# Patient Record
Sex: FEMALE | Race: WHITE | Employment: PART TIME | RURAL
[De-identification: names, ages, dates, MRNs, and addresses within clinical notes are randomized per-mention and may not be internally consistent; named-entity substitution may affect disease eponyms.]

---

## 2017-03-17 ENCOUNTER — OFFICE VISIT (OUTPATIENT)
Dept: FAMILY MEDICINE CLINIC | Age: 62
End: 2017-03-17

## 2017-03-17 VITALS
OXYGEN SATURATION: 98 % | HEIGHT: 63 IN | HEART RATE: 88 BPM | WEIGHT: 110.2 LBS | RESPIRATION RATE: 16 BRPM | DIASTOLIC BLOOD PRESSURE: 80 MMHG | SYSTOLIC BLOOD PRESSURE: 146 MMHG | BODY MASS INDEX: 19.53 KG/M2 | TEMPERATURE: 95.7 F

## 2017-03-17 DIAGNOSIS — F17.200 SMOKER: Primary | ICD-10-CM

## 2017-03-17 DIAGNOSIS — G62.9 NEUROPATHY: ICD-10-CM

## 2017-03-17 DIAGNOSIS — E11.40 TYPE 2 DIABETES MELLITUS WITH DIABETIC NEUROPATHY, WITHOUT LONG-TERM CURRENT USE OF INSULIN (HCC): ICD-10-CM

## 2017-03-17 DIAGNOSIS — E78.5 HYPERLIPIDEMIA, UNSPECIFIED HYPERLIPIDEMIA TYPE: ICD-10-CM

## 2017-03-17 RX ORDER — LISINOPRIL 10 MG/1
10 TABLET ORAL DAILY
Qty: 90 TAB | Refills: 0 | Status: SHIPPED | OUTPATIENT
Start: 2017-03-17 | End: 2017-06-05 | Stop reason: SDUPTHER

## 2017-03-17 RX ORDER — PRAVASTATIN SODIUM 20 MG/1
20 TABLET ORAL
Qty: 90 TAB | Refills: 0 | Status: SHIPPED | OUTPATIENT
Start: 2017-03-17 | End: 2017-06-05 | Stop reason: SDUPTHER

## 2017-03-17 RX ORDER — METFORMIN HYDROCHLORIDE 1000 MG/1
1000 TABLET ORAL 2 TIMES DAILY WITH MEALS
Qty: 180 TAB | Refills: 0 | Status: SHIPPED | OUTPATIENT
Start: 2017-03-17 | End: 2017-06-05 | Stop reason: SDUPTHER

## 2017-03-17 RX ORDER — GABAPENTIN 600 MG/1
600 TABLET ORAL 3 TIMES DAILY
Qty: 270 TAB | Refills: 0 | Status: SHIPPED | OUTPATIENT
Start: 2017-03-17 | End: 2017-06-05 | Stop reason: SDUPTHER

## 2017-03-17 NOTE — PATIENT INSTRUCTIONS
Noninsulin Medicines for Type 2 Diabetes: Care Instructions  Your Care Instructions  There are different types of noninsulin medicines for diabetes. Each works in a different way to help you control your blood sugar. Some types help your body make insulin to lower your blood sugar. Others lower how much insulin your body needs. Some can slow how quickly your body digests sugars. And some can remove extra glucose through your urine. Some of these medicines are:  · Alpha-glucosidase inhibitors. These keep starches from breaking down. This means that they lower the amount of glucose absorbed when you eat. They do not help your body make more insulin. So they will not cause low blood sugar unless they are used with other medicines for diabetes. They include acarbose and miglitol. · DPP-4 inhibitors. These help your body increase the level of insulin after eating. They also help your body make less of a hormone that raises blood sugar. They include linagliptin, saxagliptin, and sitagliptin. · Incretin hormones (GLP-1 receptor agonists). Your body makes a protein that can raise your insulin level, lower your blood sugar, and make you less hungry. You can inject hormone medicines that work the same way as this protein. They include exenatide and liraglutide. · Meglitinides. These help your body release insulin. They also help slow how your body digests sugars. In this way, they prevent your blood sugar from rising too fast after you eat. They include nateglinide and repaglinide. · Metformin. This lowers how much glucose your liver makes. And it helps you respond better to insulin. It also lowers the amount of stored sugar that your liver releases when you are not eating. · Sodium glucose co-transporter 2 inhibitors (SGLT2 inhibitors). These help to remove extra glucose through your urine. They may also help some people lose weight. They include canagliflozin, dapagliflozin, and empagliflozin. · Sulfonylureas. These help your body release more insulin. Some work for many hours and can cause low blood sugar if you don't eat as you expected. They include glipizide and glyburide. · Thiazolidinediones. These reduce the amount of blood glucose. They also help you respond better to insulin. They include pioglitazone and rosiglitazone. You may need to take more than one medicine for diabetes. Two or more medicines may work better to lower your blood sugar level than just one medicine. Follow-up care is a key part of your treatment and safety. Be sure to make and go to all appointments, and call your doctor if you are having problems. It's also a good idea to know your test results and keep a list of the medicines you take. How can you care for yourself at home? · Eat a healthy diet. Get some exercise each day. This may help you to reduce how much medicine you need. · Do not take other prescription or over-the-counter medicines, vitamins, herbal products, or supplements without talking to your doctor first. Some medicines for type 2 diabetes can cause problems with other medicines or supplements. · Tell your doctor if you plan to get pregnant. Some of these drugs are not safe for pregnant women. · Be safe with medicines. Take your medicines exactly as prescribed. Meglitinides or sulfonylureas can cause your blood sugar to drop very low. Call your doctor if you think you are having a problem with your medicine. · Check your blood sugar levels often. You can use a glucose monitor. Keeping track can help you know how certain foods, activities, and medicines affect your blood sugar. And it can help you keep your blood sugar from getting so low that it's not safe. When should you call for help? Call 911 anytime you think you may need emergency care. For example, call if:  · You passed out (lost consciousness), or you suddenly become very sleepy or confused.  (You may have very low blood sugar.)  · You have symptoms of high blood sugar, such as:  ¨ Blurred vision. ¨ Trouble staying awake or being woken up. ¨ Fast, deep breathing. ¨ Breath that smells fruity. ¨ Belly pain, not feeling hungry, and vomiting. ¨ Feeling confused. Call your doctor now or seek immediate medical care if:  · You are sick and cannot control your blood sugar. · You have been vomiting or have had diarrhea for more than 6 hours. · Your blood sugar stays higher than the level your doctor has set for you. · You have symptoms of low blood sugar, such as:  ¨ Sweating. ¨ Feeling nervous, shaky, and weak. ¨ Extreme hunger and slight nausea. ¨ Dizziness and headache. ¨ Blurred vision. ¨ Confusion. Watch closely for changes in your health, and be sure to contact your doctor if:  · You have a hard time knowing when your blood sugar is low. · You have trouble keeping your blood sugar in the target range. · You often have problems controlling your blood sugar. · You have symptoms of long-term diabetes problems, such as:  ¨ New vision changes. ¨ New pain, numbness, or tingling in your hands or feet. ¨ Skin problems. Where can you learn more? Go to http://renard-shanae.info/. Enter H153 in the search box to learn more about \"Noninsulin Medicines for Type 2 Diabetes: Care Instructions. \"  Current as of: August 3, 2016  Content Version: 11.1  © 4295-7655 [employer]. Care instructions adapted under license by [employer] (which disclaims liability or warranty for this information). If you have questions about a medical condition or this instruction, always ask your healthcare professional. [place] [address] any warranty or liability for your use of this information.

## 2017-03-17 NOTE — PROGRESS NOTES
Health Maintenance Due   Topic Date Due    EYE EXAM RETINAL OR DILATED Q1  11/22/1965 Done on Feb.20,Howard Young    DTaP/Tdap/Td series (1 - Tdap) 11/22/1976 refused    HEMOGLOBIN A1C Q6M  03/08/2017 refused

## 2017-03-17 NOTE — PROGRESS NOTES
Liz Hernandez is a 64 y.o. female who presents to the office today with the following:  Chief Complaint   Patient presents with    Medication Refill     all meds       HPI  Here today for medication refills. Feeling well today with no complaints. No acute concerns. Tolerating all meds w/o SEs.     T2DM- Non ID. A1c have been good, in pre-DM even. Checks BS BID. FBS range 100-109. PM range 90-95 (2hr postprandial)  Sometimes in low 80s. Denies any major sxs when low, but admits to feeling shaky maybe once/wk. Admits this often happens when does not eat much for dinner. On lisinopril for kidney prophylaxis, but BP is borderline today. Denies hx of HTN. Also takes Gabapentin for DM neuropathy in feet. Gets occasionally pins/needles sensations, but reports pretty well-controlled. Hx hypercholesterolemia. On Pravastatin. No myalgias. Review of Systems   Constitutional: Negative for fever and malaise/fatigue. Eyes: Negative. Respiratory: Negative for cough, shortness of breath and wheezing. Cardiovascular: Negative for chest pain and leg swelling. Gastrointestinal: Negative. Genitourinary: Negative. Musculoskeletal: Negative for myalgias. Skin: Negative for rash. Neurological: Negative for dizziness, tingling, sensory change, speech change and headaches. See HPI. No Known Allergies    Current Outpatient Prescriptions   Medication Sig    metFORMIN (GLUCOPHAGE) 1,000 mg tablet Take 1 Tab by mouth two (2) times daily (with meals).  lisinopril (PRINIVIL, ZESTRIL) 10 mg tablet Take 1 Tab by mouth daily.  pravastatin (PRAVACHOL) 20 mg tablet Take 1 Tab by mouth nightly.  gabapentin (NEURONTIN) 600 mg tablet Take 1 Tab by mouth three (3) times daily. No current facility-administered medications for this visit.         Past Medical History:   Diagnosis Date    Diabetes [facility])        Past Surgical History:   Procedure Laterality Date    HX ORTHOPAEDIC  2000 left hip replacement       Social History     Social History    Marital status:      Spouse name: N/A    Number of children: N/A    Years of education: N/A     Social History Main Topics    Smoking status: Current Some Day Smoker     Packs/day: 0.25    Smokeless tobacco: Never Used    Alcohol use No    Drug use: No    Sexual activity: Not Asked     Other Topics Concern    None     Social History Narrative       History reviewed. No pertinent family history. Physical Exam:  Visit Vitals    /80 (BP 1 Location: Right arm, BP Patient Position: Sitting)    Pulse 88    Temp 95.7 °F (35.4 °C) (Oral)    Resp 16    Ht 5' 2.75\" (1.594 m)    Wt 110 lb 3.2 oz (50 kg)    SpO2 98%    BMI 19.68 kg/m2     Physical Exam   Constitutional: She is oriented to person, place, and time and well-developed, well-nourished, and in no distress. HENT:   Head: Normocephalic and atraumatic. Right Ear: External ear normal.   Left Ear: External ear normal.   Nose: Nose normal.   Mouth/Throat: Oropharynx is clear and moist.   Eyes: Conjunctivae are normal.   Neck: Normal range of motion. Neck supple. Cardiovascular: Normal rate, regular rhythm and normal heart sounds. Pulmonary/Chest: Effort normal. No respiratory distress. She has wheezes (faint diffuse end exp). She has no rales. Lymphadenopathy:     She has no cervical adenopathy. Neurological: She is alert and oriented to person, place, and time. Gait normal.   Skin: Skin is warm and dry. Psychiatric: Mood and affect normal.   Nursing note and vitals reviewed. Assessment/Plan:    ICD-10-CM ICD-9-CM    1. Smoker F17.200 305.1    2.  Type 2 diabetes mellitus with diabetic neuropathy, without long-term current use of insulin (HCC) E11.40 250.60 metFORMIN (GLUCOPHAGE) 1,000 mg tablet  - Explained with the occasional episode of hypoglycemia (and pt admits to not eating much at times), last A1c in preDM range, I think its okay for her to try cutting afternoon dose in half if BS continue as they are. Pt agress, will try, and call if any questions/concerns. To notify if any concerning sxs/hypoglycemic episodes. 357.2 lisinopril (PRINIVIL, ZESTRIL) 10 mg tablet   3. Hyperlipidemia, unspecified hyperlipidemia type E78.5 272.4 pravastatin (PRAVACHOL) 20 mg tablet   4. Neuropathy G62.9 355.9 gabapentin (NEURONTIN) 600 mg tablet     Due for labs but says really expensive last time, really cannot afford today, does not have insurance. Explained necessity for labs and explained risks (notably with borderline elev Potassium last time), pt verbalizes understanding and still declines. Agrees to return in next 3 months to establish care with new PCP Dr. Star Rubio and will have labs done at that time. In meantime to rtc for any issues. .  Follow-up Disposition:  Return in about 3 months (around 6/17/2017), or sooner prn.     Vignesh Duff PA-C

## 2017-03-17 NOTE — MR AVS SNAPSHOT
Visit Information Date & Time Provider Department Dept. Phone Encounter #  
 3/17/2017 10:00 AM Geraldo Sorensen PA-C [address] [id number] Upcoming Health Maintenance Date Due  
 EYE EXAM RETINAL OR DILATED Q1 11/22/1965 DTaP/Tdap/Td series (1 - Tdap) 11/22/1976 HEMOGLOBIN A1C Q6M 3/8/2017 FOOT EXAM Q1 9/7/2017 FOBT Q 1 YEAR AGE 50-75 9/7/2017 MICROALBUMIN Q1 9/8/2017 LIPID PANEL Q1 9/8/2017 BREAST CANCER SCRN MAMMOGRAM 9/7/2018 PAP AKA CERVICAL CYTOLOGY 9/7/2019 Allergies as of 3/17/2017  Review Complete On: 3/17/2017 By: Geraldo Sorensen PA-C No Known Allergies Current Immunizations  Never Reviewed No immunizations on file. Not reviewed this visit You Were Diagnosed With   
  
 Codes Comments Type 2 diabetes mellitus with diabetic neuropathy, without long-term current use of insulin (HCC)     ICD-10-CM: E11.40 ICD-9-CM: 250.60, 357.2 Hyperlipidemia, unspecified hyperlipidemia type     ICD-10-CM: E78.5 ICD-9-CM: 272.4 Neuropathy     ICD-10-CM: G62.9 ICD-9-CM: 238. 9 Vitals BP Pulse Temp Resp Height(growth percentile) Weight(growth percentile) 146/80 (BP 1 Location: Right arm, BP Patient Position: Sitting) 88 95.7 °F (35.4 °C) (Oral) 16 5' 2.75\" (1.594 m) 110 lb 3.2 oz (50 kg) SpO2 BMI OB Status Smoking Status 98% 19.68 kg/m2 Postmenopausal Current Some Day Smoker Vitals History BMI and BSA Data Body Mass Index Body Surface Area 19.68 kg/m 2 1.49 m 2 Preferred Pharmacy Pharmacy Name Phone [facility] [address], [address] [place] [address] Ave Your Updated Medication List  
  
   
This list is accurate as of: 3/17/17 10:49 AM.  Always use your most recent med list.  
  
  
  
  
 gabapentin 600 mg tablet Commonly known as:  NEURONTIN Take 1 Tab by mouth three (3) times daily. lisinopril 10 mg tablet Commonly known as:  Camila [place] Take 1 Tab by mouth daily. metFORMIN 1,000 mg tablet Commonly known as:  GLUCOPHAGE Take 1 Tab by mouth two (2) times daily (with meals). pravastatin 20 mg tablet Commonly known as:  PRAVACHOL Take 1 Tab by mouth nightly. Prescriptions Sent to Pharmacy Refills  
 metFORMIN (GLUCOPHAGE) 1,000 mg tablet 0 Sig: Take 1 Tab by mouth two (2) times daily (with meals). Class: Normal  
 Pharmacy: [facility], [facility] Higinio Freeman Ph #: [phone] Route: Oral  
 lisinopril (PRINIVIL, ZESTRIL) 10 mg tablet 0 Sig: Take 1 Tab by mouth daily. Class: Normal  
 Pharmacy: [facility], [facility] Higinio Bandarkadeem Ph #: [phone] Route: Oral  
 pravastatin (PRAVACHOL) 20 mg tablet 0 Sig: Take 1 Tab by mouth nightly. Class: Normal  
 Pharmacy: [facility], [address] [place] [address].  Ph #: [phone] Route: Oral  
 gabapentin (NEURONTIN) 600 mg tablet 0 Sig: Take 1 Tab by mouth three (3) times daily. Class: Normal  
 Pharmacy: [facility], [address] [place] [address].  Ph #: [phone] Route: Oral  
  
Introducing [facility] & HEALTH SERVICES! Celestine Prescott introduces [employer] patient portal. Now you can access parts of your medical record, email your doctor's office, and request medication refills online. 1. In your internet browser, go to [email or web address] 2. Click on the First Time User? Click Here link in the Sign In box. You will see the New Member Sign Up page. 3. Enter your [employer] Access Code exactly as it appears below. You will not need to use this code after youve completed the sign-up process. If you do not sign up before the expiration date, you must request a new code. · [employer] Access Code: [email or web address] Expires: 6/15/2017  9:59 AM 
 
 4. Enter the last four digits of your Social Security Number (xxxx) and Date of Birth (mm/dd/yyyy) as indicated and click Submit. You will be taken to the next sign-up page. 5. Create a [employer] ID. This will be your [employer] login ID and cannot be changed, so think of one that is secure and easy to remember. 6. Create a [employer] password. You can change your password at any time. 7. Enter your Password Reset Question and Answer. This can be used at a later time if you forget your password. 8. Enter your e-mail address. You will receive e-mail notification when new information is available in [address] 9. Click Sign Up. You can now view and download portions of your medical record. 10. Click the Download Summary menu link to download a portable copy of your medical information. If you have questions, please visit the Frequently Asked Questions section of the [employer] website. Remember, [employer] is NOT to be used for urgent needs. For medical emergencies, dial 911. Now available from your iPhone and Android! Please provide this summary of care documentation to your next provider. Your primary care clinician is listed as Lili Vitale. If you have any questions after today's visit, please call [phone].

## 2017-06-05 ENCOUNTER — OFFICE VISIT (OUTPATIENT)
Dept: FAMILY MEDICINE CLINIC | Age: 62
End: 2017-06-05

## 2017-06-05 VITALS
SYSTOLIC BLOOD PRESSURE: 135 MMHG | BODY MASS INDEX: 19.31 KG/M2 | DIASTOLIC BLOOD PRESSURE: 80 MMHG | WEIGHT: 109 LBS | HEIGHT: 63 IN | TEMPERATURE: 96.8 F | HEART RATE: 67 BPM | RESPIRATION RATE: 16 BRPM | OXYGEN SATURATION: 93 %

## 2017-06-05 DIAGNOSIS — G62.9 NEUROPATHY: ICD-10-CM

## 2017-06-05 DIAGNOSIS — F17.200 SMOKER: ICD-10-CM

## 2017-06-05 DIAGNOSIS — I10 ESSENTIAL HYPERTENSION: ICD-10-CM

## 2017-06-05 DIAGNOSIS — E78.5 HYPERLIPIDEMIA, UNSPECIFIED HYPERLIPIDEMIA TYPE: ICD-10-CM

## 2017-06-05 DIAGNOSIS — E11.40 TYPE 2 DIABETES MELLITUS WITH DIABETIC NEUROPATHY, WITHOUT LONG-TERM CURRENT USE OF INSULIN (HCC): Primary | ICD-10-CM

## 2017-06-05 RX ORDER — ASPIRIN 81 MG/1
81 TABLET ORAL DAILY
COMMUNITY
Start: 2017-06-05

## 2017-06-05 RX ORDER — LISINOPRIL 10 MG/1
10 TABLET ORAL DAILY
Qty: 90 TAB | Refills: 1 | Status: SHIPPED | OUTPATIENT
Start: 2017-06-05 | End: 2017-12-06 | Stop reason: SDUPTHER

## 2017-06-05 RX ORDER — GABAPENTIN 600 MG/1
600 TABLET ORAL 3 TIMES DAILY
Qty: 270 TAB | Refills: 1 | Status: SHIPPED | OUTPATIENT
Start: 2017-06-05 | End: 2017-12-06 | Stop reason: SDUPTHER

## 2017-06-05 RX ORDER — PRAVASTATIN SODIUM 20 MG/1
20 TABLET ORAL
Qty: 90 TAB | Refills: 1 | Status: SHIPPED | OUTPATIENT
Start: 2017-06-05 | End: 2017-12-06 | Stop reason: SDUPTHER

## 2017-06-05 RX ORDER — METFORMIN HYDROCHLORIDE 1000 MG/1
1000 TABLET ORAL 2 TIMES DAILY WITH MEALS
Qty: 180 TAB | Refills: 1 | Status: SHIPPED | OUTPATIENT
Start: 2017-06-05 | End: 2017-12-06 | Stop reason: SDUPTHER

## 2017-06-05 NOTE — PATIENT INSTRUCTIONS
Continue current medications    Work on diet and exercise    Lab work this month    Keep planned follow up visit     Work on your smoking cessation  Notify Dr. Rocío Kwok if you require any help with this  You may call 1-800-QUIT NOW for additional help and to get nicotine patches    Follow up if you change your mind on any additional testing or immunizations

## 2017-06-05 NOTE — MR AVS SNAPSHOT
Visit Information Date & Time Provider Department Dept. Phone Encounter #  
 6/5/2017  3:30 PM Teodora Tabor MD [address] [id number] Follow-up Instructions Return in about 6 months (around 12/5/2017). Follow-up and Disposition History Upcoming Health Maintenance Date Due  
 EYE EXAM RETINAL OR DILATED Q1 11/22/1965 DTaP/Tdap/Td series (1 - Tdap) 11/22/1976 HEMOGLOBIN A1C Q6M 3/8/2017 INFLUENZA AGE 9 TO ADULT 8/1/2017 FOOT EXAM Q1 9/7/2017 FOBT Q 1 YEAR AGE 50-75 9/7/2017 MICROALBUMIN Q1 9/8/2017 LIPID PANEL Q1 9/8/2017 BREAST CANCER SCRN MAMMOGRAM 9/7/2018 PAP AKA CERVICAL CYTOLOGY 9/7/2019 Allergies as of 6/5/2017  Review Complete On: 6/5/2017 By: Teodora Tabor MD  
 No Known Allergies Current Immunizations  Never Reviewed No immunizations on file. Not reviewed this visit You Were Diagnosed With   
  
 Codes Comments Type 2 diabetes mellitus with diabetic neuropathy, without long-term current use of insulin (HCC)    -  Primary ICD-10-CM: E11.40 ICD-9-CM: 250.60, 357.2 Hyperlipidemia, unspecified hyperlipidemia type     ICD-10-CM: E78.5 ICD-9-CM: 272.4 Essential hypertension     ICD-10-CM: I10 
ICD-9-CM: 401.9 Smoker     ICD-10-CM: A66.466 ICD-9-CM: 305.1 Neuropathy     ICD-10-CM: G62.9 ICD-9-CM: 992. 9 Vitals BP Pulse Temp Resp Height(growth percentile) Weight(growth percentile) 135/80 67 96.8 °F (36 °C) (Oral) 16 5' 2.75\" (1.594 m) 109 lb (49.4 kg) SpO2 BMI OB Status Smoking Status 93% 19.46 kg/m2 Postmenopausal Current Some Day Smoker Vitals History BMI and BSA Data Body Mass Index Body Surface Area  
 19.46 kg/m 2 1.48 m 2 Preferred Pharmacy Pharmacy Name Phone [facility] [address] [place] [phone] Your Updated Medication List  
  
   
 This list is accurate as of: 6/5/17  4:48 PM.  Always use your most recent med list.  
  
  
  
  
 aspirin delayed-release 81 mg tablet Take 1 Tab by mouth daily. gabapentin 600 mg tablet Commonly known as:  NEURONTIN Take 1 Tab by mouth three (3) times daily. lisinopril 10 mg tablet Commonly known as:  Luz Antony Take 1 Tab by mouth daily. metFORMIN 1,000 mg tablet Commonly known as:  GLUCOPHAGE Take 1 Tab by mouth two (2) times daily (with meals). pravastatin 20 mg tablet Commonly known as:  PRAVACHOL Take 1 Tab by mouth nightly. Prescriptions Sent to Pharmacy Refills  
 gabapentin (NEURONTIN) 600 mg tablet 1 Sig: Take 1 Tab by mouth three (3) times daily. Class: Normal  
 Pharmacy: [address] [place] [address].  Ph #: [phone] Route: Oral  
 pravastatin (PRAVACHOL) 20 mg tablet 1 Sig: Take 1 Tab by mouth nightly. Class: Normal  
 Pharmacy: [address] [place] [address].  Ph #: [phone] Route: Oral  
 lisinopril (PRINIVIL, ZESTRIL) 10 mg tablet 1 Sig: Take 1 Tab by mouth daily. Class: Normal  
 Pharmacy: [facility], [facility] Higinio Freeman Ph #: [phone] Route: Oral  
 metFORMIN (GLUCOPHAGE) 1,000 mg tablet 1 Sig: Take 1 Tab by mouth two (2) times daily (with meals). Class: Normal  
 Pharmacy: [facility], [facility] Higinio Freeman Ph #: [phone] Route: Oral  
  
We Performed the Following CREATININE [id number] CPT(R)] HEMOGLOBIN A1C WITH EAG [35334 CPT(R)] POTASSIUM [id number] CPT(R)] [place] COLLECTION VENOUS BLOOD,VENIPUNCTURE [id number] CPT(R)] [place] HANDLG&/OR CONVEY OF SPEC FOR TR OFFICE TO LAB [03577 CPT(R)] Follow-up Instructions Return in about 6 months (around 12/5/2017). To-Do List   
 07/05/2017 Lab:  CREATININE   
  
 07/05/2017 Lab:  HEMOGLOBIN A1C WITH EAG   
  
 07/05/2017 Lab:  POTASSIUM Patient Instructions Continue current medications Work on diet and exercise Lab work this month Keep planned follow up visit Work on your smoking cessation Notify Dr. Марина Grimes if you require any help with this You may call 5-800-QUIT NOW for additional help and to get nicotine patches Follow up if you change your mind on any additional testing or immunizations Introducing [facility] & [facility]! Jammie Sims introduces [employer] patient portal. Now you can access parts of your medical record, email your doctor's office, and request medication refills online. 1. In your internet browser, go to [email or web address] 2. Click on the First Time User? Click Here link in the Sign In box. You will see the New Member Sign Up page. 3. Enter your [employer] Access Code exactly as it appears below. You will not need to use this code after youve completed the sign-up process. If you do not sign up before the expiration date, you must request a new code. · [employer] Access Code: [email or web address] Expires: 6/15/2017  9:59 AM 
 
4. Enter the last four digits of your Social Security Number (xxxx) and Date of Birth (mm/dd/yyyy) as indicated and click Submit. You will be taken to the next sign-up page. 5. Create a [employer] ID. This will be your [employer] login ID and cannot be changed, so think of one that is secure and easy to remember. 6. Create a [employer] password. You can change your password at any time. 7. Enter your Password Reset Question and Answer. This can be used at a later time if you forget your password. 8. Enter your e-mail address. You will receive e-mail notification when new information is available in [address] 9. Click Sign Up. You can now view and download portions of your medical record. 10. Click the Download Summary menu link to download a portable copy of your medical information. If you have questions, please visit the Frequently Asked Questions section of the [employer] website. Remember, [employer] is NOT to be used for urgent needs. For medical emergencies, dial 911. Now available from your iPhone and Android! Please provide this summary of care documentation to your next provider. Your primary care clinician is listed as Phys Other. If you have any questions after today's visit, please call [phone].

## 2017-06-05 NOTE — PROGRESS NOTES
Otilia Bosch is a 64 y.o. female who presents to the office today with the following:  Chief Complaint   Patient presents with    Medication Refill       No Known Allergies    Current Outpatient Prescriptions   Medication Sig    aspirin delayed-release 81 mg tablet Take 1 Tab by mouth daily.  gabapentin (NEURONTIN) 600 mg tablet Take 1 Tab by mouth three (3) times daily.  pravastatin (PRAVACHOL) 20 mg tablet Take 1 Tab by mouth nightly.  lisinopril (PRINIVIL, ZESTRIL) 10 mg tablet Take 1 Tab by mouth daily.  metFORMIN (GLUCOPHAGE) 1,000 mg tablet Take 1 Tab by mouth two (2) times daily (with meals). No current facility-administered medications for this visit. Past Medical History:   Diagnosis Date    Diabetes [facility])        Past Surgical History:   Procedure Laterality Date    HX ORTHOPAEDIC  2000    left hip replacement       History   Smoking Status    Current Some Day Smoker    Packs/day: 0.25   Smokeless Tobacco    Never Used       History reviewed. No pertinent family history. History of Present Illness:  Patient here for ongoing medical follow-up and refills. Wants to establish in my practice    Patient has a history of hypertension. Controlled on her current regiment. On aspirin therapy. No cardiac complaints. Base metabolic profile normal 7/29 except mild hyperkalemia. We did discuss low potassium diet. She does not want me to do an EKG. She is aware it is indicated. She wants only very limited lab work    Patient has hyperlipidemia. On Pravachol. Lab work 9/16 quite acceptable. LDL at goal.  Normal LFTs. She would prefer I not repeat lipid panel today as she has no insurance    Patient does have diabetes. Most recent A1c 6.0. Spot urine for microalbumin -9/16. Diabetic foot exam 9/16. Patient is aware she should follow with her eye doctor. We did discuss diet and exercise    Patient does have asthma/COPD. She does continue to smoke.   She has no intention of quitting. She tells me she has cut back. I do hear some wheezing. She denies any cough shortness of breath or respiratory complaints. She does not want any additional treatment for this. She does not want CAT scans or x-rays. I did give her information on the smoke ending hotline    She does have peripheral neuropathy secondary to her diabetes. She did ask for refill of her gabapentin. No new complaints here. Patient states her past medical history is otherwise negative    She declines breast exams and mammograms Pap smears colonoscopies stool tests or any additional screening test.  She is aware there indicated    Patient declines any and all immunizations. She is aware there indicated      Review of Systems:      Review of systems negative except as noted above    Physical Exam:  Visit Vitals    /80    Pulse 67    Temp 96.8 °F (36 °C) (Oral)    Resp 16    Ht 5' 2.75\" (1.594 m)    Wt 109 lb (49.4 kg)    SpO2 93%    BMI 19.46 kg/m2     Vitals:    06/05/17 1600 06/05/17 1624   BP: 158/67 135/80   BP 1 Location: Right arm    BP Patient Position: Sitting    Pulse: 67    Resp: 16    Temp: 96.8 °F (36 °C)    TempSrc: Oral    SpO2: 93%    Weight: 109 lb (49.4 kg)    Height: 5' 2.75\" (1.594 m)      Patient no acute distress vital signs on repeat as above  Head is normocephalic  External ears normal.   Eyes PERRLA. EOMs full. Sclera clear  Nose within normal limits. Nares  normal.  No significant congestion  OP mucosa normal, no obvious lesions. Pharynx normal.  No erythema or exudate. Structures midline. Neck no nodes no masses no bruits  Chest had some coarse breath sounds with scattered wheezes. No rhonchi or rales. Virginia Neer air exchange  Cor regular rate and rhythm no S3-S4 no murmurs  Abdomen no HSM no masses soft and was nontender  Extremities no edema. Nontender. Good range of motion  Gait and gross neurologic exam were normal    Assessment/Plan:  1.  Type 2 diabetes mellitus with diabetic neuropathy, without long-term current use of insulin ([address]  Historically controlled. Will check A1c. Refilled her medication. Patient agrees to follow-up in 6 months  - HEMOGLOBIN A1C WITH EAG; Future  - lisinopril (PRINIVIL, ZESTRIL) 10 mg tablet; Take 1 Tab by mouth daily. Dispense: 90 Tab; Refill: 1  - metFORMIN (GLUCOPHAGE) 1,000 mg tablet; Take 1 Tab by mouth two (2) times daily (with meals). Dispense: 180 Tab; Refill: 1    2. Hyperlipidemia, unspecified hyperlipidemia type  Medication refill. Lab work normal last fall. She declines repeat lab work at this point  - pravastatin (PRAVACHOL) 20 mg tablet; Take 1 Tab by mouth nightly. Dispense: 90 Tab; Refill: 1    3. Essential hypertension    - CREATININE; Future  - POTASSIUM; Future    4. Smoker      5. Neuropathy    - gabapentin (NEURONTIN) 600 mg tablet; Take 1 Tab by mouth three (3) times daily. Dispense: 270 Tab; Refill: 1      Patient Instructions   Continue current medications    Work on diet and exercise    Lab work this month    Keep planned follow up visit     Work on your smoking cessation  Notify Dr. Amira Ramirez if you require any help with this  You may call [facility]-QUIT NOW for additional help and to get nicotine patches    Follow up if you change your mind on any additional testing or immunizations        Continue current therapy plan except for indicated above. Verbal and written instructions (see AVS) provided.  Patient expresses understanding of diagnosis and treatment plan. Follow-up Disposition:  Return in about 6 months (around 12/5/2017). Kailee Pastor.  Amira Ramirez MD

## 2017-06-06 NOTE — PROGRESS NOTES
Labs reviewed please notify patient  Sugars acceptable with glycohemoglobin 6.3  Creatinine normal  Potassium still borderline high    Continue current medications  Avoid foods high in potassium  Keep planned follow-up

## 2017-06-08 ENCOUNTER — TELEPHONE (OUTPATIENT)
Dept: FAMILY MEDICINE CLINIC | Age: 62
End: 2017-06-08

## 2017-06-08 NOTE — TELEPHONE ENCOUNTER
Patient returning call. Documentation states she was called and notified about her her lab results. Patient states she did not receive those results. Please call her back at [phone].

## 2017-07-11 ENCOUNTER — OFFICE VISIT (OUTPATIENT)
Dept: FAMILY MEDICINE CLINIC | Age: 62
End: 2017-07-11

## 2017-07-11 VITALS
HEART RATE: 80 BPM | TEMPERATURE: 96.8 F | WEIGHT: 103 LBS | BODY MASS INDEX: 18.95 KG/M2 | OXYGEN SATURATION: 98 % | RESPIRATION RATE: 18 BRPM | HEIGHT: 62 IN | SYSTOLIC BLOOD PRESSURE: 132 MMHG | DIASTOLIC BLOOD PRESSURE: 59 MMHG

## 2017-07-11 DIAGNOSIS — M25.532 PAIN IN BOTH WRISTS: Primary | ICD-10-CM

## 2017-07-11 DIAGNOSIS — M25.531 PAIN IN BOTH WRISTS: Primary | ICD-10-CM

## 2017-07-11 RX ORDER — MELOXICAM 7.5 MG/1
TABLET ORAL DAILY
COMMUNITY
End: 2018-04-30

## 2017-07-11 RX ORDER — PREDNISONE 10 MG/1
TABLET ORAL
Qty: 39 TAB | Refills: 0 | Status: SHIPPED | OUTPATIENT
Start: 2017-07-11 | End: 2017-12-06 | Stop reason: ALTCHOICE

## 2017-07-11 NOTE — MR AVS SNAPSHOT
Visit Information Date & Time Provider Department Dept. Phone Encounter #  
 7/11/2017 10:20 AM Rip Monreal  [facility] [phone] [id number] Follow-up Instructions Return if symptoms worsen or fail to improve. Follow-up and Disposition History Your Appointments 7/11/2017 10:20 AM  
Any with Rip Monreal MD  
[address] [facility] ([address]) Appt Note: vomiting/ hand pain self pay 7/11/17 [facility] [phone]  
[phone]  
  
   
 [address] [facility] [place] [address] Upcoming Health Maintenance Date Due  
 EYE EXAM RETINAL OR DILATED Q1 11/22/1965 DTaP/Tdap/Td series (1 - Tdap) 11/22/1976 FOBT Q 1 YEAR AGE 50-75 9/7/2017 INFLUENZA AGE 9 TO ADULT 8/1/2017 FOOT EXAM Q1 9/7/2017 MICROALBUMIN Q1 9/8/2017 LIPID PANEL Q1 9/8/2017 HEMOGLOBIN A1C Q6M 12/5/2017 BREAST CANCER SCRN MAMMOGRAM 9/7/2018 PAP AKA CERVICAL CYTOLOGY 9/7/2019 Allergies as of 7/11/2017  Review Complete On: 7/11/2017 By: Matty Knowles LPN No Known Allergies Current Immunizations  Never Reviewed No immunizations on file. Not reviewed this visit You Were Diagnosed With   
  
 Codes Comments Pain in both wrists    -  Primary ICD-10-CM: M25.531, M25.532 ICD-9-CM: 719.43 Vitals BP Pulse Temp Resp Height(growth percentile) Weight(growth percentile) 132/59 (BP 1 Location: Left arm, BP Patient Position: Sitting) 80 96.8 °F (36 °C) 18 5' 2\" (1.575 m) 103 lb (46.7 kg) SpO2 BMI OB Status Smoking Status 98% 18.84 kg/m2 Postmenopausal Current Some Day Smoker BMI and BSA Data Body Mass Index Body Surface Area  
 18.84 kg/m 2 1.43 m 2 Preferred Pharmacy Pharmacy Name Phone [facility] [address] [place] [address] [facility] [phone] Your Updated Medication List  
  
   
 This list is accurate as of: 7/11/17  9:09 AM.  Always use your most recent med list.  
  
  
  
  
 aspirin delayed-release 81 mg tablet Take 1 Tab by mouth daily. gabapentin 600 mg tablet Commonly known as:  NEURONTIN Take 1 Tab by mouth three (3) times daily. lisinopril 10 mg tablet Commonly known as:  Clarence Littler Take 1 Tab by mouth daily. meloxicam 7.5 mg tablet Commonly known as:  MOBIC Take  by mouth daily. metFORMIN 1,000 mg tablet Commonly known as:  GLUCOPHAGE Take 1 Tab by mouth two (2) times daily (with meals). NAPROXEN SODIUM  
by Does Not Apply route. pravastatin 20 mg tablet Commonly known as:  PRAVACHOL Take 1 Tab by mouth nightly. predniSONE 10 mg tablet Commonly known as:  Stephanie Norlander Take 6 tab for 3d, then 4 tab for 3 d, then 2 tab for 3 d, then 1 tab for 3 d Prescriptions Sent to Pharmacy Refills  
 predniSONE (DELTASONE) 10 mg tablet 0 Sig: Take 6 tab for 3d, then 4 tab for 3 d, then 2 tab for 3 d, then 1 tab for 3 d Class: Normal  
 Pharmacy: [facility] Higinio Portillo Ph #: [phone] We Performed the Following VT COLLECTION VENOUS BLOOD,VENIPUNCTURE [id number] CPT(R)] VT HANDLG&/OR CONVEY OF SPEC FOR TR OFFICE TO LAB [32039 CPT(R)] URIC ACID [id number] CPT(R)] Follow-up Instructions Return if symptoms worsen or fail to improve. Introducing [facility]! Racheal Lutz introduces Departing patient portal. Now you can access parts of your medical record, email your doctor's office, and request medication refills online. 1. In your internet browser, go to [email or web address] 2. Click on the First Time User? Click Here link in the Sign In box. You will see the New Member Sign Up page. 3. Enter your Departing Access Code exactly as it appears below.  You will not need to use this code after youve completed the sign-up process. If you do not sign up before the expiration date, you must request a new code. · [employer] Access Code: [email or web address] Expires: 10/9/2017  9:09 AM 
 
4. Enter the last four digits of your Social Security Number (xxxx) and Date of Birth (mm/dd/yyyy) as indicated and click Submit. You will be taken to the next sign-up page. 5. Create a [employer]Kai ID. This will be your [employer] login ID and cannot be changed, so think of one that is secure and easy to remember. 6. Create a [employer]Kai password. You can change your password at any time. 7. Enter your Password Reset Question and Answer. This can be used at a later time if you forget your password. 8. Enter your e-mail address. You will receive e-mail notification when new information is available in [address] 9. Click Sign Up. You can now view and download portions of your medical record. 10. Click the Download Summary menu link to download a portable copy of your medical information. If you have questions, please visit the Frequently Asked Questions section of the [employer] website. Remember, [employer] is NOT to be used for urgent needs. For medical emergencies, dial 911. Now available from your iPhone and Android! Please provide this summary of care documentation to your next provider. Your primary care clinician is listed as Phys Other. If you have any questions after today's visit, please call [phone].

## 2017-07-11 NOTE — PROGRESS NOTES
HISTORY OF PRESENT ILLNESS  Mai Ambriz is a 64 y.o. female. Chief Complaint   Patient presents with    Hand Pain     arthritis, bilateral, severe pain causes nausea/vomiting       HPI  Hands swollen since 3d ago on right  Left hand swollen since yesterday  Pain 11/10    Saw Urgent Care Sun  On Mobic 7.5 once a day  Prev on high dose of Naproxen and helped better  Did not have Xrays or Bw  No injury    No Hx of RA, was tested for it    No Hx of Gout  Working with seafood    ROS  Past Medical History:   Diagnosis Date    Diabetes ([facility] [address]      Current Outpatient Prescriptions   Medication Sig Dispense Refill    meloxicam (MOBIC) 7.5 mg tablet Take  by mouth daily.  NAPROXEN SODIUM by Does Not Apply route.  predniSONE (DELTASONE) 10 mg tablet Take 6 tab for 3d, then 4 tab for 3 d, then 2 tab for 3 d, then 1 tab for 3 d 39 Tab 0    aspirin delayed-release 81 mg tablet Take 1 Tab by mouth daily.  gabapentin (NEURONTIN) 600 mg tablet Take 1 Tab by mouth three (3) times daily. 270 Tab 1    pravastatin (PRAVACHOL) 20 mg tablet Take 1 Tab by mouth nightly. 90 Tab 1    lisinopril (PRINIVIL, ZESTRIL) 10 mg tablet Take 1 Tab by mouth daily. 90 Tab 1    metFORMIN (GLUCOPHAGE) 1,000 mg tablet Take 1 Tab by mouth two (2) times daily (with meals). 180 Tab 1     No Known Allergies  Visit Vitals    /59 (BP 1 Location: Left arm, BP Patient Position: Sitting)    Pulse 80    Temp 96.8 °F (36 °C)    Resp 18    Ht 5' 2\" (1.575 m)    Wt 103 lb (46.7 kg)    SpO2 98%    BMI 18.84 kg/m2     Physical Exam   Constitutional: She is oriented to person, place, and time. She appears well-developed and well-nourished. No distress. HENT:   Head: Normocephalic and atraumatic.    Eyes: Conjunctivae and EOM are normal.   Pulmonary/Chest: Effort normal.   Musculoskeletal:   Both wrists with mild swelling and redness over distal forearms, decreased ROM of wrist d/t pain, mild tenderness   Neurological: She is alert and oriented to person, place, and time. Skin: Skin is warm and dry. No open wounds   Psychiatric: She has a normal mood and affect. Nursing note and vitals reviewed. ASSESSMENT and PLAN    ICD-10-CM ICD-9-CM    1.  Pain in both wrists M25.531 719.43 URIC ACID    M25.532  predniSONE (DELTASONE) 10 mg tablet   Tylenol prn

## 2017-07-12 LAB — URATE SERPL-MCNC: 3.2 MG/DL (ref 2.5–7.1)

## 2017-07-13 LAB
CREAT SERPL-MCNC: 0.54 MG/DL (ref 0.57–1)
EST. AVERAGE GLUCOSE BLD GHB EST-MCNC: 134 MG/DL
HBA1C MFR BLD: 6.3 % (ref 4.8–5.6)
POTASSIUM SERPL-SCNC: 5.6 MMOL/L (ref 3.5–5.2)

## 2017-12-06 ENCOUNTER — OFFICE VISIT (OUTPATIENT)
Dept: FAMILY MEDICINE CLINIC | Age: 62
End: 2017-12-06

## 2017-12-06 VITALS
WEIGHT: 111 LBS | SYSTOLIC BLOOD PRESSURE: 129 MMHG | TEMPERATURE: 98.5 F | OXYGEN SATURATION: 98 % | DIASTOLIC BLOOD PRESSURE: 57 MMHG | HEIGHT: 62 IN | RESPIRATION RATE: 18 BRPM | HEART RATE: 80 BPM | BODY MASS INDEX: 20.43 KG/M2

## 2017-12-06 DIAGNOSIS — F17.200 SMOKER: ICD-10-CM

## 2017-12-06 DIAGNOSIS — E78.5 HYPERLIPIDEMIA, UNSPECIFIED HYPERLIPIDEMIA TYPE: ICD-10-CM

## 2017-12-06 DIAGNOSIS — J45.909 MILD ASTHMA WITHOUT COMPLICATION, UNSPECIFIED WHETHER PERSISTENT: ICD-10-CM

## 2017-12-06 DIAGNOSIS — E11.40 TYPE 2 DIABETES MELLITUS WITH DIABETIC NEUROPATHY, WITHOUT LONG-TERM CURRENT USE OF INSULIN (HCC): Primary | ICD-10-CM

## 2017-12-06 DIAGNOSIS — I10 ESSENTIAL HYPERTENSION: ICD-10-CM

## 2017-12-06 DIAGNOSIS — G62.9 NEUROPATHY: ICD-10-CM

## 2017-12-06 RX ORDER — PRAVASTATIN SODIUM 20 MG/1
20 TABLET ORAL
Qty: 90 TAB | Refills: 1 | Status: SHIPPED | OUTPATIENT
Start: 2017-12-06 | End: 2018-04-30 | Stop reason: SDUPTHER

## 2017-12-06 RX ORDER — LISINOPRIL 10 MG/1
10 TABLET ORAL DAILY
Qty: 90 TAB | Refills: 1 | Status: SHIPPED | OUTPATIENT
Start: 2017-12-06 | End: 2018-04-30 | Stop reason: SDUPTHER

## 2017-12-06 RX ORDER — METFORMIN HYDROCHLORIDE 1000 MG/1
1000 TABLET ORAL 2 TIMES DAILY WITH MEALS
Qty: 180 TAB | Refills: 1 | Status: SHIPPED | OUTPATIENT
Start: 2017-12-06 | End: 2018-04-30 | Stop reason: SDUPTHER

## 2017-12-06 RX ORDER — GABAPENTIN 600 MG/1
600 TABLET ORAL 3 TIMES DAILY
Qty: 270 TAB | Refills: 1 | Status: SHIPPED | OUTPATIENT
Start: 2017-12-06 | End: 2018-04-30 | Stop reason: SDUPTHER

## 2017-12-06 NOTE — PROGRESS NOTES
Mercy Yoo is a 58 y.o. female who presents to the office today with the following:  Chief Complaint   Patient presents with    Medication Refill       No Known Allergies    Current Outpatient Prescriptions   Medication Sig    metFORMIN (GLUCOPHAGE) 1,000 mg tablet Take 1 Tab by mouth two (2) times daily (with meals).  lisinopril (PRINIVIL, ZESTRIL) 10 mg tablet Take 1 Tab by mouth daily.  pravastatin (PRAVACHOL) 20 mg tablet Take 1 Tab by mouth nightly.  gabapentin (NEURONTIN) 600 mg tablet Take 1 Tab by mouth three (3) times daily.  aspirin delayed-release 81 mg tablet Take 1 Tab by mouth daily.  meloxicam (MOBIC) 7.5 mg tablet Take  by mouth daily.  NAPROXEN SODIUM by Does Not Apply route. No current facility-administered medications for this visit. Past Medical History:   Diagnosis Date    Diabetes [facility])        Past Surgical History:   Procedure Laterality Date    HX ORTHOPAEDIC  2000    left hip replacement       History   Smoking Status    Current Some Day Smoker    Packs/day: 0.25   Smokeless Tobacco    Never Used       No family history on file. History of Present Illness:  Patient here for ongoing medical follow-up and refills. Patient has a history of hypertension. Controlled on her current regiment. On aspirin therapy. No cardiac complaints. Base metabolic profile normal 4/23 except mild hyperkalemia. We did discuss low potassium diet. Lab work June 2017 normal creatinine potassium again remained mildly elevated. We are repeating lab work today. She tends to get very tense and clenches her fist during blood draws which can raise her potassium. She does not want me to do an EKG. She is aware it is indicated. She wants only very limited lab work    Patient has hyperlipidemia. On Pravachol. Lab work 9/16 quite acceptable. LDL at goal.  Normal LFTs. She would prefer I not repeat lipid panel today as she has no insurance.   She would be willing to have SGOT checked. Patient does have diabetes. Most recent A1c 6.0. Spot urine for microalbumin -9/16. Diabetic foot exam 12/17. Patient is aware she should follow with her eye doctor. We did discuss diet and exercise. She brought in her glucose readings from home. They are all in the very low 100s. She does not want any lab work for diabetes today. She is aware as indicated. She does not want urine testing. She is on ACE inhibitor    Patient does have asthma/COPD. She does continue to smoke. She has no intention of quitting. She tells me she has cut back. I do hear some wheezing. She denies any cough shortness of breath or respiratory complaints. She does not want any additional treatment for this. She does not want CAT scans or x-rays. I did give her information on the smoke ending hotline    She does have peripheral neuropathy secondary to her diabetes. She did ask for refill of her gabapentin. No new complaints here. Patient states her past medical history is otherwise negative    She declines breast exams and mammograms Pap smears colonoscopies stool tests or any additional screening test.  She is aware there indicated    Patient declines any and all immunizations.   She is aware there indicated      Review of Systems:      Review of systems negative except as noted above    Physical Exam:  Visit Vitals    /57 (BP 1 Location: Right arm, BP Patient Position: Sitting)    Pulse 80    Temp 98.5 °F (36.9 °C) (Temporal)    Resp 18    Ht 5' 2\" (1.575 m)    Wt 111 lb (50.3 kg)    SpO2 98%    BMI 20.3 kg/m2     Vitals:    12/06/17 1406   BP: 129/57   BP 1 Location: Right arm   BP Patient Position: Sitting   Pulse: 80   Resp: 18   Temp: 98.5 °F (36.9 °C)   TempSrc: Temporal   SpO2: 98%   Weight: 111 lb (50.3 kg)   Height: 5' 2\" (1.575 m)     Patient no acute distress vital signs on repeat as above  Head is normocephalic  External ears normal.  Ear canals normal.  TMs were clear  Eyes PERRLA. EOMs full. Sclera clear  Nose within normal limits. Nares  normal.  No significant congestion  OP mucosa normal, no obvious lesions. Pharynx normal.  No erythema or exudate. Structures midline. Neck no nodes no masses no bruits  Chest had some coarse breath sounds with scattered wheezes. No rhonchi or rales. Chanel Marten air exchange  Cor regular rate and rhythm no S3-S4 no murmurs  Abdomen no HSM no masses soft and was nontender  Extremities no edema. Nontender. Good range of motion  Gait and gross neurologic exam were normal  Diabetic foot exam: Good diabetic foot care. No open sores or ulcerations. Vascular exam normal with capillary refill and/or good pedal pulses. Good light touch sensation    1. Type 2 diabetes mellitus with diabetic neuropathy, without long-term current use of insulin (HCC)  Historically good control. Sugars look excellent at home. She is can continue with her current medication. Checking very limited lab work today at her request.  She does agree to follow-up with me in 6 months. I encouraged her to follow-up sooner if she has any problems or if she changes her mind on any of the additional testing or treatment we discussed above    - metFORMIN (GLUCOPHAGE) 1,000 mg tablet; Take 1 Tab by mouth two (2) times daily (with meals). Dispense: 180 Tab; Refill: 1  - lisinopril (PRINIVIL, ZESTRIL) 10 mg tablet; Take 1 Tab by mouth daily. Dispense: 90 Tab; Refill: 1  - MD HANDLG&/OR CONVEY OF SPEC FOR TR OFFICE TO LAB  - MD COLLECTION VENOUS BLOOD,VENIPUNCTURE    2. Hyperlipidemia, unspecified hyperlipidemia type  Controlled on current regiment  - AST  - pravastatin (PRAVACHOL) 20 mg tablet; Take 1 Tab by mouth nightly. Dispense: 90 Tab; Refill: 1    3. Essential hypertension  Controlled on current regiment  - POTASSIUM  - CREATININE    4. Smoker    Importance of smoking cessation discussed  5. Neuropathy    - gabapentin (NEURONTIN) 600 mg tablet;  Take 1 Tab by mouth three (3) times daily. Dispense: 270 Tab; Refill: 1    6. Mild asthma without complication, unspecified whether persistent  Patient declining therapy      Patient Instructions   Continue current medications    Work on diet and exercise    Lab work    Keep planned follow up visit     Recommend additional lab work,EKG,Xray,Mammogram, Colon screening, immunizationa etc    Work on your smoking cessation  Notify Dr. Pat Elmore if you require any help with this  You may call 1-800-QUIT NOW for additional help and to get nicotine patches        Continue current therapy plan except for indicated above. Verbal and written instructions (see AVS) provided.  Patient expresses understanding of diagnosis and treatment plan. Follow-up Disposition:  Return in about 1 month (around 1/6/2018). Melani Prabhakar.  Pat Elmore MD

## 2017-12-06 NOTE — PATIENT INSTRUCTIONS
Continue current medications    Work on diet and exercise    Lab work    Keep planned follow up visit     Recommend additional lab work,EKG,Xray,Mammogram, Colon screening, immunizationa etc    Work on your smoking cessation  Notify Dr. Myra Garcia if you require any help with this  You may call 1-800-QUIT NOW for additional help and to get nicotine patches

## 2017-12-07 LAB
AST SERPL-CCNC: 11 IU/L (ref 0–40)
CREAT SERPL-MCNC: 0.41 MG/DL (ref 0.57–1)
GFR SERPLBLD CREATININE-BSD FMLA CKD-EPI: 111 ML/MIN/1.73
GFR SERPLBLD CREATININE-BSD FMLA CKD-EPI: 128 ML/MIN/1.73
POTASSIUM SERPL-SCNC: 4.7 MMOL/L (ref 3.5–5.2)

## 2017-12-29 ENCOUNTER — TELEPHONE (OUTPATIENT)
Dept: FAMILY MEDICINE CLINIC | Age: 62
End: 2017-12-29

## 2018-04-30 ENCOUNTER — OFFICE VISIT (OUTPATIENT)
Dept: FAMILY MEDICINE CLINIC | Age: 63
End: 2018-04-30

## 2018-04-30 VITALS
BODY MASS INDEX: 19.77 KG/M2 | DIASTOLIC BLOOD PRESSURE: 55 MMHG | TEMPERATURE: 96.5 F | WEIGHT: 107.4 LBS | HEIGHT: 62 IN | SYSTOLIC BLOOD PRESSURE: 130 MMHG | RESPIRATION RATE: 16 BRPM | OXYGEN SATURATION: 98 % | HEART RATE: 75 BPM

## 2018-04-30 DIAGNOSIS — F17.200 SMOKER: ICD-10-CM

## 2018-04-30 DIAGNOSIS — E78.5 HYPERLIPIDEMIA, UNSPECIFIED HYPERLIPIDEMIA TYPE: ICD-10-CM

## 2018-04-30 DIAGNOSIS — G62.9 NEUROPATHY: ICD-10-CM

## 2018-04-30 DIAGNOSIS — I10 ESSENTIAL HYPERTENSION: ICD-10-CM

## 2018-04-30 DIAGNOSIS — E11.40 TYPE 2 DIABETES MELLITUS WITH DIABETIC NEUROPATHY, WITHOUT LONG-TERM CURRENT USE OF INSULIN (HCC): Primary | ICD-10-CM

## 2018-04-30 LAB — HBA1C MFR BLD HPLC: 6.3 %

## 2018-04-30 RX ORDER — PRAVASTATIN SODIUM 20 MG/1
20 TABLET ORAL
Qty: 90 TAB | Refills: 1 | Status: SHIPPED | OUTPATIENT
Start: 2018-04-30 | End: 2018-11-28 | Stop reason: SDUPTHER

## 2018-04-30 RX ORDER — LISINOPRIL 10 MG/1
10 TABLET ORAL DAILY
Qty: 90 TAB | Refills: 1 | Status: SHIPPED | OUTPATIENT
Start: 2018-04-30 | End: 2018-11-28 | Stop reason: SDUPTHER

## 2018-04-30 RX ORDER — GABAPENTIN 600 MG/1
600 TABLET ORAL 3 TIMES DAILY
Qty: 270 TAB | Refills: 1 | Status: SHIPPED | OUTPATIENT
Start: 2018-04-30 | End: 2018-11-28 | Stop reason: SDUPTHER

## 2018-04-30 RX ORDER — METFORMIN HYDROCHLORIDE 1000 MG/1
1000 TABLET ORAL 2 TIMES DAILY WITH MEALS
Qty: 180 TAB | Refills: 1 | Status: SHIPPED | OUTPATIENT
Start: 2018-04-30 | End: 2018-11-28 | Stop reason: SDUPTHER

## 2018-04-30 NOTE — PROGRESS NOTES
Chief Complaint   Patient presents with    Diabetes     med refills; metformin, lisinopril, pravastatin, gabapentin     Health Maintenance Due   Topic Date Due    EYE EXAM RETINAL OR DILATED Q1  11/22/1965    FOOT EXAM Q1  09/07/2017    FOBT Q 1 YEAR AGE 50-75  09/07/2017     Visit Vitals    /45 (BP 1 Location: Left arm, BP Patient Position: Sitting)    Pulse 75    Temp 96.5 °F (35.8 °C) (Oral)    Resp 16    Ht 5' 2\" (1.575 m)    Wt 107 lb 6.4 oz (48.7 kg)    SpO2 98%    BMI 19.64 kg/m2     Aviva Juárez LPN

## 2018-04-30 NOTE — PROGRESS NOTES
Almaz Moon is a 58 y.o. female who presents to the office today with the following:  Chief Complaint   Patient presents with    Diabetes     med refills; metformin, lisinopril, pravastatin, gabapentin       No Known Allergies    Current Outpatient Prescriptions   Medication Sig    gabapentin (NEURONTIN) 600 mg tablet Take 1 Tab by mouth three (3) times daily.  pravastatin (PRAVACHOL) 20 mg tablet Take 1 Tab by mouth nightly.  metFORMIN (GLUCOPHAGE) 1,000 mg tablet Take 1 Tab by mouth two (2) times daily (with meals).  lisinopril (PRINIVIL, ZESTRIL) 10 mg tablet Take 1 Tab by mouth daily.  NAPROXEN SODIUM by Does Not Apply route.  aspirin delayed-release 81 mg tablet Take 1 Tab by mouth daily. No current facility-administered medications for this visit. Past Medical History:   Diagnosis Date    Diabetes [facility])        Past Surgical History:   Procedure Laterality Date    HX ORTHOPAEDIC  2000    left hip replacement       History   Smoking Status    Current Some Day Smoker    Packs/day: 0.25   Smokeless Tobacco    Never Used       No family history on file. History of Present Illness:  Patient here for ongoing medical follow-up and refills. Patient has a history of hypertension. Controlled on her current regiment. On aspirin therapy. No cardiac complaints. Base metabolic profile normal 7/02 except mild hyperkalemia. We did discuss low potassium diet. Lab work December 2017 normal creatinine and potassium. She does not want me to do an EKG. She is aware it is indicated. She has no insurance and wants no routine lab work today    Patient has hyperlipidemia. On Pravachol. Lab work 9/16 quite acceptable. LDL at goal.  Normal LFTs. She would prefer I not repeat lipid panel today as she has no insurance. S SGOT normal 12/17    Patient does have diabetes. Most recent A1c 6.3  4/18. Spot urine for microalbumin -9/16. She declines repeat microalbumin testing. She is on ACE inhibitor. Diabetic foot exam 4/18. Patient is aware she should follow with her eye doctor. We did discuss diet and exercise. She brought in her glucose readings from home. They are all in the very low 100s. Patient does have asthma/COPD. She does continue to smoke. She has no intention of quitting. She tells me she has cut back to 4 cigarettes a day. I do hear some wheezing. She denies any cough shortness of breath or respiratory complaints. She does not want any additional treatment for this. She does not want CAT scans or x-rays. I did give her information on the smoke ending hotline    She does have peripheral neuropathy secondary to her diabetes. She did ask for refill of her gabapentin. No new complaints here. Patient states her past medical history is otherwise negative    She declines breast exams and mammograms Pap smears colonoscopies stool tests or any additional screening test.  She is aware there indicated    Patient declines any and all immunizations. She is aware there indicated      Review of Systems:      Review of systems negative except as noted above    Physical Exam:  Visit Vitals    /55    Pulse 75    Temp 96.5 °F (35.8 °C) (Oral)    Resp 16    Ht 5' 2\" (1.575 m)    Wt 107 lb 6.4 oz (48.7 kg)    SpO2 98%    BMI 19.64 kg/m2     Vitals:    04/30/18 1305 04/30/18 1326   BP: 136/45 130/55   BP 1 Location: Left arm    BP Patient Position: Sitting    Pulse: 75    Resp: 16    Temp: 96.5 °F (35.8 °C)    TempSrc: Oral    SpO2: 98%    Weight: 107 lb 6.4 oz (48.7 kg)    Height: 5' 2\" (1.575 m)      Patient no acute distress vital signs on repeat as above  Head is normocephalic  External ears normal.  Ear canals normal.    Eyes PERRLA. EOMs full. Sclera clear  Nose within normal limits. Nares  normal.  No significant congestion  OP mucosa normal, no obvious lesions. Pharynx normal.  No erythema or exudate. Structures midline.   In need of some dental work.  Patient aware  Neck no nodes no masses no bruits  Chest had some coarse breath sounds with scattered wheezes unchanged from previous. No rhonchi or rales. Murlene Rumps air exchange  Cor regular rate and rhythm no S3-S4 no murmurs  Abdomen no HSM no masses soft and was nontender  Extremities no edema. Nontender. Good range of motion  Gait and gross neurologic exam were normal  Diabetic foot exam: Good diabetic foot care. No open sores or ulcerations. Vascular exam normal with capillary refill and/or good pedal pulses. Good light touch sensation    1. Type 2 diabetes mellitus with diabetic neuropathy, without long-term current use of insulin ([address]  . Good control today. Patient will continue her current medications. Continue working on her diet and exercise plan. I like to see her back in 6 months sooner if needed  - AMB POC HEMOGLOBIN A1C  -  DIABETES FOOT EXAM  - metFORMIN (GLUCOPHAGE) 1,000 mg tablet; Take 1 Tab by mouth two (2) times daily (with meals). Dispense: 180 Tab; Refill: 1  - lisinopril (PRINIVIL, ZESTRIL) 10 mg tablet; Take 1 Tab by mouth daily. Dispense: 90 Tab; Refill: 1    2. Hyperlipidemia, unspecified hyperlipidemia type  Controlled on her current regimen historically. Declining blood work today  - pravastatin (PRAVACHOL) 20 mg tablet; Take 1 Tab by mouth nightly. Dispense: 90 Tab; Refill: 1    3. Essential hypertension  Controlled on her current regimen. Recent creatinine and potassium normal    4. Smoker  Importance of smoking cessation discussed    5. Neuropathy  Meds refilled  - gabapentin (NEURONTIN) 600 mg tablet; Take 1 Tab by mouth three (3) times daily. Dispense: 270 Tab;  Refill: 1    Patient Instructions   Continue current medications    Work on diet and exercise  Work on smoking sessation    Let Dr Valentina Greenfield know if you change your mind on additional lab tests, immunizations or cancer screens    Keep planned follow up visit         Continue current therapy plan except for indicated above. Verbal and written instructions (see AVS) provided.  Patient expresses understanding of diagnosis and treatment plan. Follow-up Disposition:  Return in about 6 months (around 10/30/2018). [facility].  Lanette Munoz MD

## 2018-04-30 NOTE — PATIENT INSTRUCTIONS
Continue current medications    Work on diet and exercise  Work on smoking sessation    Let Dr Chaya Ruby know if you change your mind on additional lab tests, immunizations or cancer screens    Keep planned follow up visit

## 2018-11-28 DIAGNOSIS — E78.5 HYPERLIPIDEMIA, UNSPECIFIED HYPERLIPIDEMIA TYPE: ICD-10-CM

## 2018-11-28 DIAGNOSIS — E11.40 TYPE 2 DIABETES MELLITUS WITH DIABETIC NEUROPATHY, WITHOUT LONG-TERM CURRENT USE OF INSULIN (HCC): ICD-10-CM

## 2018-11-28 DIAGNOSIS — G62.9 NEUROPATHY: ICD-10-CM

## 2018-11-28 RX ORDER — GABAPENTIN 600 MG/1
600 TABLET ORAL 3 TIMES DAILY
Qty: 90 TAB | Refills: 0 | Status: SHIPPED | OUTPATIENT
Start: 2018-11-28 | End: 2018-11-30 | Stop reason: SDUPTHER

## 2018-11-28 RX ORDER — PRAVASTATIN SODIUM 20 MG/1
20 TABLET ORAL
Qty: 30 TAB | Refills: 0 | Status: SHIPPED | OUTPATIENT
Start: 2018-11-28 | End: 2018-11-30 | Stop reason: SDUPTHER

## 2018-11-28 RX ORDER — METFORMIN HYDROCHLORIDE 1000 MG/1
1000 TABLET ORAL 2 TIMES DAILY WITH MEALS
Qty: 60 TAB | Refills: 0 | Status: SHIPPED | OUTPATIENT
Start: 2018-11-28 | End: 2019-01-10 | Stop reason: SDUPTHER

## 2018-11-28 RX ORDER — LISINOPRIL 10 MG/1
10 TABLET ORAL DAILY
Qty: 30 TAB | Refills: 0 | Status: SHIPPED | OUTPATIENT
Start: 2018-11-28 | End: 2019-01-10 | Stop reason: SDUPTHER

## 2018-11-28 NOTE — TELEPHONE ENCOUNTER
Requested Prescriptions     Pending Prescriptions Disp Refills    gabapentin (NEURONTIN) 600 mg tablet 270 Tab 1     Sig: Take 1 Tab by mouth three (3) times daily.  lisinopril (PRINIVIL, ZESTRIL) 10 mg tablet 90 Tab 1     Sig: Take 1 Tab by mouth daily.  metFORMIN (GLUCOPHAGE) 1,000 mg tablet 180 Tab 1     Sig: Take 1 Tab by mouth two (2) times daily (with meals).  pravastatin (PRAVACHOL) 20 mg tablet 90 Tab 1     Sig: Take 1 Tab by mouth nightly.

## 2018-11-28 NOTE — TELEPHONE ENCOUNTER
Allowing temporary refill while Dr. Severo Gomez is out of the office. Should request all future refills of this medication to PCP.

## 2019-02-04 PROBLEM — M19.041 LOCALIZED OSTEOARTHRITIS OF BOTH HANDS: Status: ACTIVE | Noted: 2019-02-04

## 2019-02-04 PROBLEM — M19.042 LOCALIZED OSTEOARTHRITIS OF BOTH HANDS: Status: ACTIVE | Noted: 2019-02-04

## 2020-07-28 PROBLEM — M1A.00X0 IDIOPATHIC CHRONIC GOUT WITHOUT TOPHUS: Status: ACTIVE | Noted: 2020-01-01

## 2021-01-01 ENCOUNTER — TRANSCRIBE ORDER (OUTPATIENT)
Dept: INTERNAL MEDICINE CLINIC | Age: 66
End: 2021-01-01

## 2021-01-01 ENCOUNTER — OFFICE VISIT (OUTPATIENT)
Dept: ONCOLOGY | Age: 66
End: 2021-01-01
Payer: MEDICARE

## 2021-01-01 ENCOUNTER — TELEPHONE (OUTPATIENT)
Dept: FAMILY MEDICINE CLINIC | Age: 66
End: 2021-01-01

## 2021-01-01 ENCOUNTER — TRANSCRIBE ORDER (OUTPATIENT)
Dept: SCHEDULING | Age: 66
End: 2021-01-01

## 2021-01-01 ENCOUNTER — HOSPITAL ENCOUNTER (OUTPATIENT)
Dept: INFUSION THERAPY | Age: 66
Discharge: HOME | End: 2021-03-12
Payer: MEDICARE

## 2021-01-01 ENCOUNTER — APPOINTMENT (OUTPATIENT)
Dept: GENERAL RADIOLOGY | Age: 66
End: 2021-01-01
Attending: EMERGENCY MEDICINE
Payer: MEDICARE

## 2021-01-01 ENCOUNTER — HOSPITAL ENCOUNTER (OUTPATIENT)
Dept: ULTRASOUND IMAGING | Age: 66
Discharge: HOME | End: 2021-03-02
Attending: INTERNAL MEDICINE
Payer: MEDICARE

## 2021-01-01 ENCOUNTER — HOSPITAL ENCOUNTER (EMERGENCY)
Age: 66
Discharge: HOME | End: 2021-02-14
Attending: EMERGENCY MEDICINE
Payer: MEDICARE

## 2021-01-01 VITALS
HEIGHT: 62 IN | WEIGHT: 100 LBS | OXYGEN SATURATION: 95 % | HEART RATE: 90 BPM | RESPIRATION RATE: 16 BRPM | BODY MASS INDEX: 18.4 KG/M2 | TEMPERATURE: 98.2 F | SYSTOLIC BLOOD PRESSURE: 161 MMHG | DIASTOLIC BLOOD PRESSURE: 60 MMHG

## 2021-01-01 VITALS
SYSTOLIC BLOOD PRESSURE: 99 MMHG | DIASTOLIC BLOOD PRESSURE: 84 MMHG | HEIGHT: 62 IN | RESPIRATION RATE: 18 BRPM | WEIGHT: 101 LBS | OXYGEN SATURATION: 94 % | TEMPERATURE: 98 F | BODY MASS INDEX: 18.58 KG/M2 | HEART RATE: 92 BPM

## 2021-01-01 VITALS
WEIGHT: 102.2 LBS | OXYGEN SATURATION: 97 % | TEMPERATURE: 97.6 F | HEART RATE: 88 BPM | HEIGHT: 61 IN | BODY MASS INDEX: 19.3 KG/M2 | DIASTOLIC BLOOD PRESSURE: 57 MMHG | RESPIRATION RATE: 16 BRPM | SYSTOLIC BLOOD PRESSURE: 109 MMHG

## 2021-01-01 DIAGNOSIS — R91.8 LUNG MASS: ICD-10-CM

## 2021-01-01 DIAGNOSIS — R07.81 RIB PAIN: Primary | ICD-10-CM

## 2021-01-01 DIAGNOSIS — G89.3 NEOPLASM RELATED PAIN: ICD-10-CM

## 2021-01-01 DIAGNOSIS — C34.90 SMALL CELL LUNG CANCER (HCC): ICD-10-CM

## 2021-01-01 DIAGNOSIS — C78.7 METASTATIC CARCINOMA TO LIVER (HCC): Primary | ICD-10-CM

## 2021-01-01 DIAGNOSIS — C34.90 SMALL CELL LUNG CANCER (HCC): Primary | ICD-10-CM

## 2021-01-01 DIAGNOSIS — C78.7 METASTATIC CARCINOMA TO LIVER (HCC): ICD-10-CM

## 2021-01-01 DIAGNOSIS — R10.11 ABDOMINAL PAIN, RIGHT UPPER QUADRANT: ICD-10-CM

## 2021-01-01 LAB
ALBUMIN SERPL-MCNC: 2.3 G/DL (ref 3.5–5)
ALBUMIN SERPL-MCNC: 3 G/DL (ref 3.5–5)
ALBUMIN/GLOB SERPL: 0.6 {RATIO} (ref 1.1–2.2)
ALBUMIN/GLOB SERPL: 0.8 {RATIO} (ref 1.1–2.2)
ALP SERPL-CCNC: 285 U/L (ref 45–117)
ALP SERPL-CCNC: 856 U/L (ref 45–117)
ALT SERPL-CCNC: 55 U/L (ref 12–78)
ALT SERPL-CCNC: 96 U/L (ref 12–78)
ANION GAP SERPL CALC-SCNC: 10 MMOL/L (ref 5–15)
ANION GAP SERPL CALC-SCNC: 6 MMOL/L (ref 5–15)
AST SERPL-CCNC: 132 U/L (ref 15–37)
AST SERPL-CCNC: 44 U/L (ref 15–37)
BASOPHILS # BLD: 0 K/UL (ref 0–0.1)
BASOPHILS # BLD: 0 K/UL (ref 0–0.1)
BASOPHILS NFR BLD: 0 % (ref 0–1)
BASOPHILS NFR BLD: 0 % (ref 0–1)
BILIRUB DIRECT SERPL-MCNC: 1.6 MG/DL (ref 0–0.2)
BILIRUB SERPL-MCNC: 0.8 MG/DL (ref 0.2–1)
BILIRUB SERPL-MCNC: 1.9 MG/DL (ref 0.2–1)
BNP SERPL-MCNC: 306 PG/ML
BUN SERPL-MCNC: 17 MG/DL (ref 6–20)
BUN SERPL-MCNC: 18 MG/DL (ref 6–20)
BUN/CREAT SERPL: 27 (ref 12–20)
BUN/CREAT SERPL: 30 (ref 12–20)
CALCIUM SERPL-MCNC: 8.8 MG/DL (ref 8.5–10.1)
CALCIUM SERPL-MCNC: 8.9 MG/DL (ref 8.5–10.1)
CHLORIDE SERPL-SCNC: 92 MMOL/L (ref 97–108)
CHLORIDE SERPL-SCNC: 95 MMOL/L (ref 97–108)
CO2 SERPL-SCNC: 26 MMOL/L (ref 21–32)
CO2 SERPL-SCNC: 29 MMOL/L (ref 21–32)
CREAT SERPL-MCNC: 0.61 MG/DL (ref 0.55–1.02)
CREAT SERPL-MCNC: 0.63 MG/DL (ref 0.55–1.02)
DIFFERENTIAL METHOD BLD: ABNORMAL
DIFFERENTIAL METHOD BLD: ABNORMAL
EOSINOPHIL # BLD: 0 K/UL (ref 0–0.4)
EOSINOPHIL # BLD: 0 K/UL (ref 0–0.4)
EOSINOPHIL NFR BLD: 0 % (ref 0–7)
EOSINOPHIL NFR BLD: 0 % (ref 0–7)
ERYTHROCYTE [DISTWIDTH] IN BLOOD BY AUTOMATED COUNT: 14.7 % (ref 11.5–14.5)
ERYTHROCYTE [DISTWIDTH] IN BLOOD BY AUTOMATED COUNT: 19.4 % (ref 11.5–14.5)
GLOBULIN SER CALC-MCNC: 3.6 G/DL (ref 2–4)
GLOBULIN SER CALC-MCNC: 3.8 G/DL (ref 2–4)
GLUCOSE SERPL-MCNC: 156 MG/DL (ref 65–100)
GLUCOSE SERPL-MCNC: 271 MG/DL (ref 65–100)
HCT VFR BLD AUTO: 34.5 % (ref 35–47)
HCT VFR BLD AUTO: 35 % (ref 35–47)
HGB BLD-MCNC: 11.6 G/DL (ref 11.5–16)
HGB BLD-MCNC: 11.9 G/DL (ref 11.5–16)
IMM GRANULOCYTES # BLD AUTO: 0.1 K/UL (ref 0–0.04)
IMM GRANULOCYTES # BLD AUTO: 0.1 K/UL (ref 0–0.04)
IMM GRANULOCYTES NFR BLD AUTO: 1 % (ref 0–0.5)
IMM GRANULOCYTES NFR BLD AUTO: 1 % (ref 0–0.5)
INR PPP: 1.1 (ref 0.9–1.1)
LYMPHOCYTES # BLD: 0.9 K/UL (ref 0.8–3.5)
LYMPHOCYTES # BLD: 1 K/UL (ref 0.8–3.5)
LYMPHOCYTES NFR BLD: 6 % (ref 12–49)
LYMPHOCYTES NFR BLD: 7 % (ref 12–49)
MCH RBC QN AUTO: 26.4 PG (ref 26–34)
MCH RBC QN AUTO: 27.2 PG (ref 26–34)
MCHC RBC AUTO-ENTMCNC: 33.1 G/DL (ref 30–36.5)
MCHC RBC AUTO-ENTMCNC: 34.5 G/DL (ref 30–36.5)
MCV RBC AUTO: 78.9 FL (ref 80–99)
MCV RBC AUTO: 79.5 FL (ref 80–99)
MONOCYTES # BLD: 0.4 K/UL (ref 0–1)
MONOCYTES # BLD: 0.9 K/UL (ref 0–1)
MONOCYTES NFR BLD: 3 % (ref 5–13)
MONOCYTES NFR BLD: 6 % (ref 5–13)
NEUTS SEG # BLD: 12.8 K/UL (ref 1.8–8)
NEUTS SEG # BLD: 13.4 K/UL (ref 1.8–8)
NEUTS SEG NFR BLD: 86 % (ref 32–75)
NEUTS SEG NFR BLD: 90 % (ref 32–75)
NRBC # BLD: 0 K/UL (ref 0–0.01)
NRBC # BLD: 0 K/UL (ref 0–0.01)
NRBC BLD-RTO: 0 PER 100 WBC
NRBC BLD-RTO: 0 PER 100 WBC
PLATELET # BLD AUTO: 488 K/UL (ref 150–400)
PLATELET # BLD AUTO: 492 K/UL (ref 150–400)
PMV BLD AUTO: 8.8 FL (ref 8.9–12.9)
PMV BLD AUTO: 8.9 FL (ref 8.9–12.9)
POTASSIUM SERPL-SCNC: 4.2 MMOL/L (ref 3.5–5.1)
POTASSIUM SERPL-SCNC: 4.7 MMOL/L (ref 3.5–5.1)
PROT SERPL-MCNC: 6.1 G/DL (ref 6.4–8.2)
PROT SERPL-MCNC: 6.6 G/DL (ref 6.4–8.2)
PROTHROMBIN TIME: 11.5 SEC (ref 9–11.1)
RBC # BLD AUTO: 4.37 M/UL (ref 3.8–5.2)
RBC # BLD AUTO: 4.4 M/UL (ref 3.8–5.2)
RBC MORPH BLD: ABNORMAL
SODIUM SERPL-SCNC: 127 MMOL/L (ref 136–145)
SODIUM SERPL-SCNC: 131 MMOL/L (ref 136–145)
TROPONIN I SERPL-MCNC: <0.05 NG/ML
WBC # BLD AUTO: 14.8 K/UL (ref 3.6–11)
WBC # BLD AUTO: 14.9 K/UL (ref 3.6–11)

## 2021-01-01 PROCEDURE — 3017F COLORECTAL CA SCREEN DOC REV: CPT | Performed by: INTERNAL MEDICINE

## 2021-01-01 PROCEDURE — G8754 DIAS BP LESS 90: HCPCS | Performed by: INTERNAL MEDICINE

## 2021-01-01 PROCEDURE — 36415 COLL VENOUS BLD VENIPUNCTURE: CPT

## 2021-01-01 PROCEDURE — 2709999900 HC NON-CHARGEABLE SUPPLY

## 2021-01-01 PROCEDURE — 88333 PATH CONSLTJ SURG CYTO XM 1: CPT

## 2021-01-01 PROCEDURE — 80076 HEPATIC FUNCTION PANEL: CPT

## 2021-01-01 PROCEDURE — 83880 ASSAY OF NATRIURETIC PEPTIDE: CPT

## 2021-01-01 PROCEDURE — 77030014115 US GUIDE BX LIV PERC

## 2021-01-01 PROCEDURE — 74011250636 HC RX REV CODE- 250/636: Performed by: EMERGENCY MEDICINE

## 2021-01-01 PROCEDURE — G8427 DOCREV CUR MEDS BY ELIG CLIN: HCPCS | Performed by: INTERNAL MEDICINE

## 2021-01-01 PROCEDURE — 74011250637 HC RX REV CODE- 250/637: Performed by: EMERGENCY MEDICINE

## 2021-01-01 PROCEDURE — 74011250636 HC RX REV CODE- 250/636: Performed by: STUDENT IN AN ORGANIZED HEALTH CARE EDUCATION/TRAINING PROGRAM

## 2021-01-01 PROCEDURE — 71045 X-RAY EXAM CHEST 1 VIEW: CPT

## 2021-01-01 PROCEDURE — G8420 CALC BMI NORM PARAMETERS: HCPCS | Performed by: INTERNAL MEDICINE

## 2021-01-01 PROCEDURE — G8752 SYS BP LESS 140: HCPCS | Performed by: INTERNAL MEDICINE

## 2021-01-01 PROCEDURE — 1090F PRES/ABSN URINE INCON ASSESS: CPT | Performed by: INTERNAL MEDICINE

## 2021-01-01 PROCEDURE — 84484 ASSAY OF TROPONIN QUANT: CPT

## 2021-01-01 PROCEDURE — 96374 THER/PROPH/DIAG INJ IV PUSH: CPT

## 2021-01-01 PROCEDURE — G8400 PT W/DXA NO RESULTS DOC: HCPCS | Performed by: INTERNAL MEDICINE

## 2021-01-01 PROCEDURE — 77030040395 HC NDL BIOP COAX INTRO MRTM -B

## 2021-01-01 PROCEDURE — 80048 BASIC METABOLIC PNL TOTAL CA: CPT

## 2021-01-01 PROCEDURE — G8510 SCR DEP NEG, NO PLAN REQD: HCPCS | Performed by: INTERNAL MEDICINE

## 2021-01-01 PROCEDURE — G8536 NO DOC ELDER MAL SCRN: HCPCS | Performed by: INTERNAL MEDICINE

## 2021-01-01 PROCEDURE — 85025 COMPLETE CBC W/AUTO DIFF WBC: CPT

## 2021-01-01 PROCEDURE — 80053 COMPREHEN METABOLIC PANEL: CPT

## 2021-01-01 PROCEDURE — 1101F PT FALLS ASSESS-DOCD LE1/YR: CPT | Performed by: INTERNAL MEDICINE

## 2021-01-01 PROCEDURE — 85610 PROTHROMBIN TIME: CPT

## 2021-01-01 PROCEDURE — 99284 EMERGENCY DEPT VISIT MOD MDM: CPT

## 2021-01-01 PROCEDURE — 88307 TISSUE EXAM BY PATHOLOGIST: CPT

## 2021-01-01 PROCEDURE — 99204 OFFICE O/P NEW MOD 45 MIN: CPT | Performed by: INTERNAL MEDICINE

## 2021-01-01 RX ORDER — ONDANSETRON 4 MG/1
4 TABLET, ORALLY DISINTEGRATING ORAL
Qty: 20 TAB | Refills: 0 | Status: SHIPPED | OUTPATIENT
Start: 2021-01-01 | End: 2021-01-01 | Stop reason: SDUPTHER

## 2021-01-01 RX ORDER — MORPHINE SULFATE 15 MG/1
15 TABLET ORAL
Qty: 90 TAB | Refills: 0 | Status: SHIPPED | OUTPATIENT
Start: 2021-01-01 | End: 2021-03-22

## 2021-01-01 RX ORDER — DEXAMETHASONE 4 MG/1
8 TABLET ORAL DAILY
Qty: 60 TAB | Refills: 3 | Status: SHIPPED | OUTPATIENT
Start: 2021-01-01

## 2021-01-01 RX ORDER — HYDROCODONE BITARTRATE AND ACETAMINOPHEN 5; 325 MG/1; MG/1
1 TABLET ORAL
Status: COMPLETED | OUTPATIENT
Start: 2021-01-01 | End: 2021-01-01

## 2021-01-01 RX ORDER — HYDROCODONE BITARTRATE AND ACETAMINOPHEN 5; 325 MG/1; MG/1
1 TABLET ORAL
Qty: 20 TAB | Refills: 0 | Status: SHIPPED | OUTPATIENT
Start: 2021-01-01 | End: 2021-01-01 | Stop reason: SDUPTHER

## 2021-01-01 RX ORDER — MORPHINE SULFATE 2 MG/ML
2 INJECTION, SOLUTION INTRAMUSCULAR; INTRAVENOUS
Status: DISCONTINUED | OUTPATIENT
Start: 2021-01-01 | End: 2021-01-01

## 2021-01-01 RX ORDER — MORPHINE SULFATE 15 MG/1
15 TABLET ORAL
Qty: 90 TAB | Refills: 0 | Status: SHIPPED | OUTPATIENT
Start: 2021-01-01 | End: 2021-01-01 | Stop reason: SDUPTHER

## 2021-01-01 RX ORDER — ONDANSETRON 2 MG/ML
4 INJECTION INTRAMUSCULAR; INTRAVENOUS
Status: COMPLETED | OUTPATIENT
Start: 2021-01-01 | End: 2021-01-01

## 2021-01-01 RX ORDER — ONDANSETRON 4 MG/1
4 TABLET, ORALLY DISINTEGRATING ORAL
Qty: 20 TAB | Refills: 0 | Status: SHIPPED | OUTPATIENT
Start: 2021-01-01

## 2021-01-01 RX ORDER — HYDROCODONE BITARTRATE AND ACETAMINOPHEN 5; 325 MG/1; MG/1
1 TABLET ORAL
Qty: 20 TAB | Refills: 0 | Status: SHIPPED | OUTPATIENT
Start: 2021-01-01 | End: 2021-01-01

## 2021-01-01 RX ORDER — DEXAMETHASONE 4 MG/1
8 TABLET ORAL DAILY
Qty: 60 TAB | Refills: 3 | Status: SHIPPED | OUTPATIENT
Start: 2021-01-01 | End: 2021-01-01 | Stop reason: SDUPTHER

## 2021-01-01 RX ORDER — SODIUM CHLORIDE 9 MG/ML
25 INJECTION, SOLUTION INTRAVENOUS CONTINUOUS
Status: DISCONTINUED | OUTPATIENT
Start: 2021-01-01 | End: 2021-01-01 | Stop reason: HOSPADM

## 2021-01-01 RX ORDER — MIDAZOLAM HYDROCHLORIDE 1 MG/ML
5 INJECTION INTRAMUSCULAR; INTRAVENOUS
Status: DISCONTINUED | OUTPATIENT
Start: 2021-01-01 | End: 2021-01-01 | Stop reason: HOSPADM

## 2021-01-01 RX ORDER — FENTANYL CITRATE 50 UG/ML
100 INJECTION, SOLUTION INTRAMUSCULAR; INTRAVENOUS
Status: DISCONTINUED | OUTPATIENT
Start: 2021-01-01 | End: 2021-01-01 | Stop reason: HOSPADM

## 2021-01-01 RX ADMIN — SODIUM CHLORIDE 25 ML/HR: 9 INJECTION, SOLUTION INTRAVENOUS at 09:40

## 2021-01-01 RX ADMIN — HYDROCODONE BITARTRATE AND ACETAMINOPHEN 1 TABLET: 5; 325 TABLET ORAL at 15:10

## 2021-01-01 RX ADMIN — ONDANSETRON 4 MG: 2 INJECTION INTRAMUSCULAR; INTRAVENOUS at 15:10

## 2021-01-01 RX ADMIN — MIDAZOLAM HYDROCHLORIDE 1 MG: 1 INJECTION, SOLUTION INTRAMUSCULAR; INTRAVENOUS at 09:50

## 2021-01-01 RX ADMIN — ONDANSETRON 4 MG: 2 INJECTION INTRAMUSCULAR; INTRAVENOUS at 09:40

## 2021-01-01 RX ADMIN — FENTANYL CITRATE 50 MCG: 50 INJECTION, SOLUTION INTRAMUSCULAR; INTRAVENOUS at 09:53

## 2021-02-14 NOTE — ED NOTES
Pt received from triage with c/o bilateral rib pain that radiates around her back and into her neck. Pt was recently diagnosed with lung and liver CA and is scheduled to see pulmonology next week but reports \" she couldn't wait because the pain was too bad\". Pt presently A&Ox4 with friend at bedside.

## 2021-02-14 NOTE — ED PROVIDER NOTES
EMERGENCY DEPARTMENT HISTORY AND PHYSICAL EXAM 
 
 
Date: 2/14/2021 Patient Name: Toi Burris History of Presenting Illness Chief Complaint Patient presents with  Rib Pain Pain at right and left rib area for 3-4 days. Reports recent dx of lung and liver Ca. Sched to see pulm 2/18. History Provided By: Patient HPI: Toi Burris, 72 y.o. female presents to the ED with cc of bilateral lower rib pain and abdominal pain. The patient states that her abdominal pain began 2 weeks ago. She had a CT abdomen pelvis on the ninth, which revealed multiple liver mets, a pleural-based lesion right lower lung with osteolytic destruction of the adjacent rib as well as a large amount of stool in the colon. Her abdominal pain is located in the mid abdomen around the umbilicus that she states is the same pain she has had for 2 weeks. That is a 7 out of 10 in severity. Her bilateral lower rib pain is also 7 out of 10 in severity. She has a chronic cough related to bronchitis which is nonproductive. She denies fever, chills, leg pain or leg edema. She was constipated but took a laxative and had a bowel movement yesterday. She denies dysuria. She has not been taking anything for pain other than Advil, which does not help her symptoms. Lower rib pain associated with shortness of breath, nausea but no diaphoresis. Also had a CT of the chest on January 19 which revealed a left hilar mass left, left lower lobe consolidation mediastinal adenopathy, small left pleural effusion. There are no other complaints, changes, or physical findings at this time. PCP: Ayaka Garcia MD 
 
No current facility-administered medications on file prior to encounter. Current Outpatient Medications on File Prior to Encounter Medication Sig Dispense Refill  predniSONE (DELTASONE) 20 mg tablet Take 60 mg by mouth daily for 3 days, THEN 40 mg daily for 3 days, THEN 20 mg daily for 3 days. 18 Tab 0  
 ondansetron (ZOFRAN ODT) 4 mg disintegrating tablet Take 1 Tab by mouth every eight (8) hours as needed for Nausea or Vomiting. 20 Tab 0  
 albuterol (PROVENTIL HFA, VENTOLIN HFA, PROAIR HFA) 90 mcg/actuation inhaler Take 2 Puffs by inhalation every four (4) hours as needed for Wheezing. 1 Inhaler 3  
 inhalational spacing device 1 Each by Does Not Apply route as needed for Wheezing. 1 Each 0  
 lisinopriL (PRINIVIL, ZESTRIL) 20 mg tablet TAKE 2 tabs BY MOUTH ONCE DAILY 180 Tab 0  pravastatin (PRAVACHOL) 20 mg tablet Take 1 Tab by mouth nightly. 90 Tab 0  
 gabapentin (NEURONTIN) 600 mg tablet Take 1 Tab by mouth three (3) times daily. 270 Tab 1  
 naproxen (NAPROSYN) 500 mg tablet Take 1 Tab by mouth two (2) times daily (with meals). 60 Tab 2  
 aspirin delayed-release 81 mg tablet Take 81 mg by mouth daily. Taking every other day. Past History Past Medical History: 
Past Medical History:  
Diagnosis Date  Arthritis  Essential hypertension 6/5/2017  Gout  Tobacco use disorder, continuous 2/11/2015  Type 2 diabetes mellitus with diabetic neuropathy ([facility] [address] 4/30/2018 Past Surgical History: 
Past Surgical History:  
Procedure Laterality Date  HX ORTHOPAEDIC  2000  
 left hip replacement Family History: 
History reviewed. No pertinent family history. Social History: 
Social History Tobacco Use  Smoking status: Former Smoker Types: Cigarettes  Smokeless tobacco: Never Used  Tobacco comment: stopped smoking in the summer of 20 Substance Use Topics  Alcohol use: No  
 Drug use: No  
 
 
Allergies: 
No Known Allergies Review of Systems Review of Systems Constitutional: Negative for fever. HENT: Negative for congestion. Eyes: Negative. Respiratory: Positive for shortness of breath. Cardiovascular: Positive for chest pain. Gastrointestinal: Positive for abdominal pain. Endocrine: Negative for heat intolerance. Genitourinary: Negative for dysuria. Musculoskeletal: Negative for back pain. Skin: Negative for rash. Allergic/Immunologic: Negative for immunocompromised state. Neurological: Negative for light-headedness. Hematological: Does not bruise/bleed easily. Psychiatric/Behavioral: Negative. All other systems reviewed and are negative. Physical Exam  
Physical Exam 
Vitals signs and nursing note reviewed. Constitutional:   
   General: She is not in acute distress. Appearance: She is well-developed. HENT:  
   Head: Normocephalic. Neck: Musculoskeletal: Normal range of motion and neck supple. Cardiovascular:  
   Rate and Rhythm: Normal rate and regular rhythm. Pulses: Normal pulses. Heart sounds: Normal heart sounds. Pulmonary:  
   Effort: Pulmonary effort is normal.  
   Breath sounds: Normal breath sounds. Comments: Right greater than left lower rib tenderness Chest:  
   Chest wall: Tenderness present. Abdominal:  
   General: Bowel sounds are normal.  
   Palpations: Abdomen is soft. Tenderness: There is abdominal tenderness. Comments: Right upper quadrant, epigastric and periumbilical tenderness Musculoskeletal: Normal range of motion. Skin: 
   General: Skin is warm and dry. Neurological:  
   General: No focal deficit present. Mental Status: She is alert and oriented to person, place, and time. Psychiatric:     
   Mood and Affect: Mood normal.     
   Behavior: Behavior normal.  
 
 
 
Diagnostic Study Results Labs - Recent Results (from the past 12 hour(s)) CBC WITH AUTOMATED DIFF Collection Time: 02/14/21  1:31 PM  
Result Value Ref Range WBC 14.8 (H) 3.6 - 11.0 K/uL  
 RBC 4.40 3.80 - 5.20 M/uL  
 HGB 11.6 11.5 - 16.0 g/dL HCT 35.0 35.0 - 47.0 %  MCV 79.5 (L) 80.0 - 99.0 FL  
 MCH 26.4 26.0 - 34.0 PG  
 MCHC 33.1 30.0 - 36.5 g/dL  
 RDW 14.7 (H) 11.5 - 14.5 % PLATELET 481 (H) 115 - 400 K/uL MPV 8.8 (L) 8.9 - 12.9 FL  
 NRBC 0.0 0  WBC ABSOLUTE NRBC 0.00 0.00 - 0.01 K/uL NEUTROPHILS 90 (H) 32 - 75 % LYMPHOCYTES 6 (L) 12 - 49 % MONOCYTES 3 (L) 5 - 13 % EOSINOPHILS 0 0 - 7 % BASOPHILS 0 0 - 1 % IMMATURE GRANULOCYTES 1 (H) 0.0 - 0.5 % ABS. NEUTROPHILS 13.4 (H) 1.8 - 8.0 K/UL  
 ABS. LYMPHOCYTES 0.9 0.8 - 3.5 K/UL  
 ABS. MONOCYTES 0.4 0.0 - 1.0 K/UL  
 ABS. EOSINOPHILS 0.0 0.0 - 0.4 K/UL  
 ABS. BASOPHILS 0.0 0.0 - 0.1 K/UL  
 ABS. IMM. GRANS. 0.1 (H) 0.00 - 0.04 K/UL  
 DF SMEAR SCANNED    
 RBC COMMENTS NORMOCYTIC, NORMOCHROMIC METABOLIC PANEL, COMPREHENSIVE Collection Time: 02/14/21  1:31 PM  
Result Value Ref Range Sodium 127 (L) 136 - 145 mmol/L Potassium 4.2 3.5 - 5.1 mmol/L Chloride 92 (L) 97 - 108 mmol/L  
 CO2 29 21 - 32 mmol/L Anion gap 6 5 - 15 mmol/L Glucose 271 (H) 65 - 100 mg/dL BUN 18 6 - 20 MG/DL Creatinine 0.61 0.55 - 1.02 MG/DL  
 BUN/Creatinine ratio 30 (H) 12 - 20 GFR est AA >60 >60 ml/min/1.73m2 GFR est non-AA >60 >60 ml/min/1.73m2 Calcium 8.8 8.5 - 10.1 MG/DL Bilirubin, total 0.8 0.2 - 1.0 MG/DL  
 ALT (SGPT) 55 12 - 78 U/L  
 AST (SGOT) 44 (H) 15 - 37 U/L Alk. phosphatase 285 (H) 45 - 117 U/L Protein, total 6.6 6.4 - 8.2 g/dL Albumin 3.0 (L) 3.5 - 5.0 g/dL Globulin 3.6 2.0 - 4.0 g/dL A-G Ratio 0.8 (L) 1.1 - 2.2 NT-PRO BNP Collection Time: 02/14/21  1:31 PM  
Result Value Ref Range NT pro- (H) <125 PG/ML  
TROPONIN I Collection Time: 02/14/21  1:31 PM  
Result Value Ref Range Troponin-I, Qt. <0.05 <0.05 ng/mL PROTHROMBIN TIME + INR Collection Time: 02/14/21  1:47 PM  
Result Value Ref Range INR 1.1 0.9 - 1.1 Prothrombin time 11.5 (H) 9.0 - 11.1 sec Radiologic Studies -  
XR CHEST PORT Final Result Probable small left pleural effusion. Left hilum is less prominent  
than seen previously. CT Results  (Last 48 hours) None CXR Results  (Last 48 hours) None Medical Decision Making I am the first provider for this patient. I reviewed the vital signs, available nursing notes, past medical history, past surgical history, family history and social history. Vital Signs-Reviewed the patient's vital signs. Patient Vitals for the past 12 hrs: 
 Temp Pulse Resp BP SpO2  
02/14/21 1400    (!) 151/70 95 % 02/14/21 1345    (!) 121/92 94 % 02/14/21 1311 98 °F (36.7 °C) 92 18 (!) 159/68 96 % Records Reviewed: Nursing Notes, Old Medical Records, Previous Radiology Studies and Previous Laboratory Studies Provider Notes (Medical Decision Making):  
Pain related to cancer and metastatic disease, pneumonia, bronchitis, CAD 
 
ED Course:  
Initial assessment performed. The patients presenting problems have been discussed, and they are in agreement with the care plan formulated and outlined with them. I have encouraged them to ask questions as they arise throughout their visit. Progress note: The patient's results were reviewed. The patient is feeling better. I believe all of her pain is related to her lung mass and metastatic disease. She is advised to follow-up and return to ER if worse Critical Care Time:  
0 Disposition: 
home DISCHARGE PLAN: 
1. Discharge Medication List as of 2/14/2021  3:21 PM  
  
CONTINUE these medications which have CHANGED Details HYDROcodone-acetaminophen (Norco) 5-325 mg per tablet Take 1 Tab by mouth every six (6) hours as needed for Pain for up to 5 days. Max Daily Amount: 4 Tabs., Normal, Disp-20 Tab, R-0  
  
ondansetron (Zofran ODT) 4 mg disintegrating tablet Take 1 Tab by mouth every eight (8) hours as needed for Nausea., Normal, Disp-20 Tab, R-0  
  
  
CONTINUE these medications which have NOT CHANGED Details  
predniSONE (DELTASONE) 20 mg tablet Take 60 mg by mouth daily for 3 days, THEN 40 mg daily for 3 days, THEN 20 mg daily for 3 days. , Normal, Disp-18 Tab, R-0  
  
albuterol (PROVENTIL HFA, VENTOLIN HFA, PROAIR HFA) 90 mcg/actuation inhaler Take 2 Puffs by inhalation every four (4) hours as needed for Wheezing., Normal, Disp-1 Inhaler, R-3  
  
inhalational spacing device 1 Each by Does Not Apply route as needed for Wheezing., Normal, Disp-1 Each, R-0  
  
lisinopriL (PRINIVIL, ZESTRIL) 20 mg tablet TAKE 2 tabs BY MOUTH ONCE DAILY, Normal, Disp-180 Tab, R-0Please consider 90 day supplies to promote better adherence  
  
pravastatin (PRAVACHOL) 20 mg tablet Take 1 Tab by mouth nightly., Normal, Disp-90 Tab, R-0Needs appt  
  
gabapentin (NEURONTIN) 600 mg tablet Take 1 Tab by mouth three (3) times daily. , Normal, Disp-270 Tab, R-1Needs f/u appt  
  
naproxen (NAPROSYN) 500 mg tablet Take 1 Tab by mouth two (2) times daily (with meals). , Normal, Disp-60 Tab, R-2  
  
aspirin delayed-release 81 mg tablet Take 81 mg by mouth daily. Taking every other day., Historical Med 2. Follow-up Information Follow up With Specialties Details Why Contact Info Rosa Engel MD Family Medicine  As needed [address] [address] 
[phone] If symptoms worsen Keep your appointment on Thursday [facility] EMERGENCY DEPT Emergency Medicine   [address] [place] [address] 
[phone] 3. Return to ED if worse Diagnosis Clinical Impression: 1. Rib pain 2. Abdominal pain, right upper quadrant 3. Lung mass Attestations: 
 
Gutierrez Ndiaye MD 
 
 Please note that this dictation was completed with [employer], the computer voice recognition software. Quite often unanticipated grammatical, syntax, homophones, and other interpretive errors are inadvertently transcribed by the computer software. Please disregard these errors. Please excuse any errors that have escaped final proofreading. Thank you.

## 2021-03-01 NOTE — PROGRESS NOTES
Have you been tested for COVID-19 in the past 7 days? NO    Do you have a personal history of COVID-19 within the past 28 days? NO  If Yes, What was the method of testing: clinical assumption or test result? Have you had close contact with a known to be positive COVID-19 patient within the past 14 days? NO    Are you a healthcare worker or ? NO  If Yes, have you been exposed to COVID-19 without proper PPE? Do you live in a SNF, adult home or other institutional setting? NO   If Yes, have they experienced a flood of COVID-19 positive patients?     In the past 2-14 days have you had any of the following symptoms    Cough   New onset Shortness of breath or difficulty breathing    Or at least two of these symptoms:   Fever greater than 100 F   Chills   Repeated shaking with chills   Muscle pain   Headache   Sore throat   New loss of taste or smell   New onset diarrhea  NO

## 2021-03-02 NOTE — PROGRESS NOTES
Patient sitting up in bed eating crackers and drinking Pepsi. Patient denies any nausea or SOB at this time. ambulatory

## 2021-03-02 NOTE — DISCHARGE INSTRUCTIONS
[facility]  Radiology Department  [phone]      Radiologist: Ammon Newman    Date: 03/02/2021      Liver Biopsy Discharge Instructions      You may have an aching pain in the biopsy site tonight. Take Tylenol if allowed, as directed on the label, for pain or discomfort. Avoid ibuprofen (Advil, Motrin) and aspirin for the next 48 hours as these drugs may increase your risk of bleeding. Resume your previous diet and resume your prescribed medications. You have been given sedating medications today. Go home and rest.  Do not drive or make any important decisions. Avoid strenuous activity,  do not lift anything heavier than a small grocery bag (10 pounds) and avoid excessive twisting and reaching for the next 5 days. If you experience severe sweating, severe abdominal pain, dizziness or faintness, go to the nearest Emergency Room immediately. Pain under the left collar bone is normal.    Watch for signs of infection at biopsy site:  redness, pain, drainage, fever chills. If this occurs, call you doctor. Follow up with your ordering physician as previously discussed to receive results. If you have any questions or concerns, please call [phone] and ask to speak to a radiology nurse.

## 2021-03-02 NOTE — PROGRESS NOTES
Discharge instructions given to patient by Rn. Pt verbalized understanding of discharge instructions. Pt discharged without difficulty. Pt. Discharged in stable condition via wheelchair , accompanied by mother.

## 2021-03-02 NOTE — PROGRESS NOTES
Name of procedure: US liver biopsy    Sedation medications given: 1mg Versed, 50 mcg Fentanyl    Sedation tolerated: Well    Vital Signs: Stable    Fluids removed: None    Samples sent to lab: YES    Any complications related to procedure: None    Post Procedure Care Needed/order sets placed in [facility].

## 2021-03-02 NOTE — H&P
Radiology History and Physical    Patient: Bao Chowdhury 72 y.o. female       Chief Complaint: No chief complaint on file. History of Present Illness: Left lobe lung mass. Liver lesions for bx. History:    Past Medical History:   Diagnosis Date    Arthritis     Essential hypertension 6/5/2017    Gout     Tobacco use disorder, continuous 2/11/2015    Type 2 diabetes mellitus with diabetic neuropathy ([facility] [address] 4/30/2018     No family history on file.   Social History     Socioeconomic History    Marital status:      Spouse name: Not on file    Number of children: Not on file    Years of education: Not on file    Highest education level: Not on file   Occupational History    Occupation: Repairy     Employer: [employer]. Financial resource strain: Not on file    Food insecurity     Worry: Not on file     Inability: Not on file    Transportation needs     Medical: Not on file     Non-medical: Not on file   Tobacco Use    Smoking status: Former Smoker     Types: Cigarettes    Smokeless tobacco: Never Used    Tobacco comment: stopped smoking in the summer of 20   Substance and Sexual Activity    Alcohol use: No    Drug use: No    Sexual activity: Not on file   Lifestyle    Physical activity     Days per week: Not on file     Minutes per session: Not on file    Stress: Not on file   Relationships    Social connections     Talks on phone: Not on file     Gets together: Not on file     Attends [employer] service: Not on file     Active member of club or organization: Not on file     Attends meetings of clubs or organizations: Not on file     Relationship status: Not on file    Intimate partner violence     Fear of current or ex partner: Not on file     Emotionally abused: Not on file     Physically abused: Not on file     Forced sexual activity: Not on file   Other Topics Concern    Not on file   Social History Narrative    Not on file       Allergies: No Known Allergies    Current Medications:  Current Outpatient Medications   Medication Sig    oxyCODONE IR (ROXICODONE) 10 mg tab immediate release tablet Take 1 Tab by mouth every six (6) hours as needed for Pain for up to 7 days. Max Daily Amount: 40 mg.    senna (Senna Concentrate) 8.6 mg tablet Take 1 Tab by mouth two (2) times a day.  ondansetron (Zofran ODT) 4 mg disintegrating tablet Take 1 Tab by mouth every eight (8) hours as needed for Nausea.  albuterol (PROVENTIL HFA, VENTOLIN HFA, PROAIR HFA) 90 mcg/actuation inhaler Take 2 Puffs by inhalation every four (4) hours as needed for Wheezing.  inhalational spacing device 1 Each by Does Not Apply route as needed for Wheezing.  lisinopriL (PRINIVIL, ZESTRIL) 20 mg tablet TAKE 2 tabs BY MOUTH ONCE DAILY    pravastatin (PRAVACHOL) 20 mg tablet Take 1 Tab by mouth nightly.  gabapentin (NEURONTIN) 600 mg tablet Take 1 Tab by mouth three (3) times daily.  naproxen (NAPROSYN) 500 mg tablet Take 1 Tab by mouth two (2) times daily (with meals).  aspirin delayed-release 81 mg tablet Take 81 mg by mouth daily. Taking every other day. Current Facility-Administered Medications   Medication Dose Route Frequency    fentaNYL citrate (PF) injection 100 mcg  100 mcg IntraVENous Multiple    0.9% sodium chloride infusion  25 mL/hr IntraVENous CONTINUOUS    midazolam (PF) (VERSED) 1 mg/mL injection 5 mg  5 mg IntraVENous Rad Multiple        Physical Exam:  Blood pressure (!) 159/54, pulse 94, temperature 98.2 °F (36.8 °C), resp. rate 16, height 5' 2\" (1.575 m), weight 45.4 kg (100 lb), SpO2 95 %, not currently breastfeeding. GENERAL: alert, cooperative, no distress, appears stated age  LUNG: clear to auscultation bilaterally  HEART: regular rate and rhythm  ABD: Non tender, non distended.       Alerts:    Hospital Problems  Date Reviewed: 2/4/2019    None          Laboratory:    No results for input(s): HGB, HCT, WBC, PLT, INR, BUN, CREA, K, CRCLT, HGBEXT, HCTEXT, PLTEXT, INREXT in the last 72 hours. No lab exists for component: PTT, PT      Plan of Care/Planned Procedure:  Risks, benefits, and alternatives reviewed with patient and she agrees to proceed with the procedure. Deemed appropriate or moderate sedation with versed and fentanyl.       Marily Malin MD

## 2021-03-02 NOTE — PROGRESS NOTES
Dr. Marysol Campos at bedside to obtain consent for liver biopsy. Procedure explained with opportunity to ask questions. Patient states understanding of procedure prior to signing consent. Discharge instructions and expectations reviewed with patient. Written copy given to patient. RN to review again post procedure before discharge.

## 2021-03-02 NOTE — PROGRESS NOTES
Patient is up and ambulatory with no complaints of pain. Biopsy site dressing remains clean and dry.

## 2021-03-08 NOTE — TELEPHONE ENCOUNTER
----- Message from Diego Amador sent at 3/8/2021 10:59 AM EST -----  Regarding: Ludivina Styles MD  Patient return call    Caller's first and last name and relationship (if not the patient):      Best contact number(s): [phone]      Whose call is being returned: Ludivina Styles MD      Details to clarify the request: pt is requesting callback before eob 03/08/2021      Diego Amdaor

## 2021-03-08 NOTE — PROGRESS NOTES
Nathaniel Conner is a 72 y.o. female new patient today referred by DR Elaine Chacon for Metastatic SCLC. Bipospy has been done. Patient complains of Pain in RLE today, abdomen is becoming swollen and painful, she only has a BM about once a week. She has zofran for her nausea but this makes her worse. She states she is in pain all the time has no relief.

## 2021-03-09 NOTE — TELEPHONE ENCOUNTER
Jonathan, [address] [facility] Office             Patient return call     Caller's first and last name and relationship (if not the patient):n/a       Best contact number(s): [phone]       Whose call is being returned: n/a       Details to clarify the request: pt thinks she missed a call from Dr Sarah Roberts

## 2021-03-12 NOTE — PROGRESS NOTES
Reason for Visit:  
Marcianne Hatchet is a 72 y.o. female who is seen for new extensive stage small cell lung cancer Treatment History: Dx: Extensive Stage Small Cell Lung Cancer--3/2/2021 Tx: Complete staging Goal: Prolong Survival 
 
History of Present Illness:  
Here for above. Having problems with pain and dyspnea. Also with abd swelling. Doesn't understand why can't cut the cancer out. Past Medical History:  
Diagnosis Date  Arthritis  Essential hypertension 2017  Gout  Tobacco use disorder, continuous 2015  Type 2 diabetes mellitus with diabetic neuropathy ([facility] [address] 2018 Past Surgical History:  
Procedure Laterality Date  HX ORTHOPAEDIC    
 left hip replacement Social History Tobacco Use  Smoking status: Former Smoker Packs/day: 1.00 Years: 30.00 Pack years: 30.00 Types: Cigarettes Start date: 200 Quit date: 2020 Years since quittin.5  Smokeless tobacco: Former User Quit date: 2020  Tobacco comment: stopped smoking in the summer of 20 Substance Use Topics  Alcohol use: No  
  
Family History Problem Relation Age of Onset  Lung Cancer Father  Colon Cancer Paternal Aunt Current Outpatient Medications Medication Sig  
 oxyCODONE IR (ROXICODONE) 5 mg immediate release tablet Take 1 Tab by mouth every four (4) hours as needed for Pain for up to 14 days. Max Daily Amount: 30 mg.  
 senna (Senna Concentrate) 8.6 mg tablet Take 1 Tab by mouth two (2) times a day.  ondansetron (Zofran ODT) 4 mg disintegrating tablet Take 1 Tab by mouth every eight (8) hours as needed for Nausea.  albuterol (PROVENTIL HFA, VENTOLIN HFA, PROAIR HFA) 90 mcg/actuation inhaler Take 2 Puffs by inhalation every four (4) hours as needed for Wheezing.  inhalational spacing device 1 Each by Does Not Apply route as needed for Wheezing.   
 lisinopriL (PRINIVIL, ZESTRIL) 20 mg tablet TAKE 2 tabs BY MOUTH ONCE DAILY  pravastatin (PRAVACHOL) 20 mg tablet Take 1 Tab by mouth nightly.  gabapentin (NEURONTIN) 600 mg tablet Take 1 Tab by mouth three (3) times daily.  naproxen (NAPROSYN) 500 mg tablet Take 1 Tab by mouth two (2) times daily (with meals).  aspirin delayed-release 81 mg tablet Take 81 mg by mouth daily. Taking every other day. No current facility-administered medications for this visit. No Known Allergies Review of Systems: A complete review of systems was obtained, negative except as described above. Physical Exam:  
 
Visit Vitals BP (!) 109/57 Pulse 88 Temp 97.6 °F (36.4 °C) (Oral) Resp 16 Ht 5' 0.67\" (1.541 m) Wt 102 lb 3.2 oz (46.4 kg) SpO2 97% BMI 19.52 kg/m² ECOG PS: 0 General: No distress Eyes: PERRLA, anicteric sclerae HENT: Atraumatic, OP clear Neck: Supple Lymphatic: No cervical, supraclavicular, or inguinal adenopathy Respiratory: CTAB, normal respiratory effort CV: Normal rate, regular rhythm, no murmurs, no peripheral edema GI: Soft, nontender, nondistended, no masses, no hepatomegaly, no splenomegaly MS: Normal gait and station. Digits without clubbing or cyanosis. Skin: No rashes, ecchymoses, or petechiae. Normal temperature, turgor, and texture. Psych: Alert, oriented, appropriate affect, normal judgment/insight Results:  
 
Lab Results Component Value Date/Time WBC 14.8 (H) 02/14/2021 01:31 PM  
 HGB 11.6 02/14/2021 01:31 PM  
 HCT 35.0 02/14/2021 01:31 PM  
 PLATELET 855 (H) 01/70/9045 01:31 PM  
 MCV 79.5 (L) 02/14/2021 01:31 PM  
 ABS. NEUTROPHILS 13.4 (H) 02/14/2021 01:31 PM  
 
Lab Results Component Value Date/Time  Sodium 127 (L) 02/14/2021 01:31 PM  
 Potassium 4.2 02/14/2021 01:31 PM  
 Chloride 92 (L) 02/14/2021 01:31 PM  
 CO2 29 02/14/2021 01:31 PM  
 Glucose 271 (H) 02/14/2021 01:31 PM  
 BUN 18 02/14/2021 01:31 PM  
 Creatinine 0.61 02/14/2021 01:31 PM  
 GFR est AA >60 02/14/2021 01:31 PM  
 GFR est non-AA >60 02/14/2021 01:31 PM  
 Calcium 8.8 02/14/2021 01:31 PM  
 
Lab Results Component Value Date/Time Bilirubin, total 0.8 02/14/2021 01:31 PM  
 ALT (SGPT) 55 02/14/2021 01:31 PM  
 Alk. phosphatase 285 (H) 02/14/2021 01:31 PM  
 Protein, total 6.6 02/14/2021 01:31 PM  
 Albumin 3.0 (L) 02/14/2021 01:31 PM  
 Globulin 3.6 02/14/2021 01:31 PM  
 
 
CT Chest 1/19/2021 IMPRESSION 1. CT findings compatible with the presence of a left hilar mass lesion with 
associated vascular encasement of the lower lobe branch of the left pulmonary 
artery. Evidence of left lower lobe consolidation and atelectatic change. No 
evidence of mediastinal or right hilar adenopathy. No definite evidence of right 
axillary lymphadenopathy (see discussion above). 2. Presence of a focal, pleural-based soft tissue lesion in the posteromedial 
aspect of the right mid/lower lung with evidence of osteolytic destruction 
involving an adjacent rib. 3. Presence of a small left pleural effusion. 4. Presence of multiple focal low-density areas within the liver quite 
compatible with metastatic disease. Evidence of fatty infiltration involving the 
liver. 5. Inhomogeneity of the left lobe of the thyroid gland as described above. CT A/P 2/9/2021 IMPRESSION 1. Presence of multiple focal low-density areas within the liver compatible with 
metastatic lesions. 2. Presence of a focal, pleural-based soft tissue lesion in the posteromedial 
aspect of the right mid/lower lung with evidence of osteolytic destruction 
involving an adjacent rib. 3. Normal appearance of the pancreas. 4. Normal-sized spleen. Presence of multiple punctate cavitations within spleen 
compatible with calcified granulomata. 5. Normal sized kidneys with evidence of nephrocalcinosis. No definite evidence 
of urolithiasis. 6. Limited evaluation of the urinary bladder. 7. Presence of a moderately large amount of gas and fecal material within the 
Colon. Percutaneous liver biopsy 3/2/2021 Small Cell Carcinoma Assessment:  
1) Small Cell Lung Cancer--Extensive Stage 2) Neoplasm Pain 3) Abd Distention Plan:  
1) Need to finish work up with MRI brain and bone scan. This appears to be very aggressive. Wants chemo--went over prognosis (poor) and treatment at length. Will get port placed. 2) Try Morphine 15mg tab--half to 1 whole every 3-4 hours as needed for pain--thinks taken morphine with good results in past.  Discussed [facility] 
3) Likely malignant ascites--is having bm's and no n/v--no signs of obstruction. Chemo will help if malignant.  
 
Signed By: Kevin Jacobson MD

## 2021-08-09 ENCOUNTER — TELEPHONE (OUTPATIENT)
Dept: FAMILY MEDICINE CLINIC | Age: 66
End: 2021-08-09

## 2021-08-09 NOTE — TELEPHONE ENCOUNTER
Mr. yKra Purcell is pt executor of small asset estate, and would like a review of Ksenia's record as well as copies of the documentation of when you first suspected Ms. Vazquez had cancer.
